# Patient Record
Sex: MALE | Race: WHITE | NOT HISPANIC OR LATINO | Employment: FULL TIME | URBAN - METROPOLITAN AREA
[De-identification: names, ages, dates, MRNs, and addresses within clinical notes are randomized per-mention and may not be internally consistent; named-entity substitution may affect disease eponyms.]

---

## 2017-04-05 ENCOUNTER — ALLSCRIPTS OFFICE VISIT (OUTPATIENT)
Dept: OTHER | Facility: OTHER | Age: 42
End: 2017-04-05

## 2017-05-04 ENCOUNTER — ALLSCRIPTS OFFICE VISIT (OUTPATIENT)
Dept: OTHER | Facility: OTHER | Age: 42
End: 2017-05-04

## 2018-01-13 VITALS
RESPIRATION RATE: 16 BRPM | TEMPERATURE: 98.7 F | WEIGHT: 203.31 LBS | HEIGHT: 69 IN | DIASTOLIC BLOOD PRESSURE: 92 MMHG | OXYGEN SATURATION: 97 % | HEART RATE: 80 BPM | SYSTOLIC BLOOD PRESSURE: 126 MMHG | BODY MASS INDEX: 30.11 KG/M2

## 2018-01-13 VITALS
HEIGHT: 70 IN | WEIGHT: 202 LBS | SYSTOLIC BLOOD PRESSURE: 138 MMHG | BODY MASS INDEX: 28.92 KG/M2 | OXYGEN SATURATION: 97 % | TEMPERATURE: 98.1 F | HEART RATE: 94 BPM | DIASTOLIC BLOOD PRESSURE: 88 MMHG

## 2018-08-20 ENCOUNTER — OFFICE VISIT (OUTPATIENT)
Dept: FAMILY MEDICINE CLINIC | Facility: CLINIC | Age: 43
End: 2018-08-20
Payer: COMMERCIAL

## 2018-08-20 ENCOUNTER — TRANSCRIBE ORDERS (OUTPATIENT)
Dept: ADMINISTRATIVE | Facility: HOSPITAL | Age: 43
End: 2018-08-20

## 2018-08-20 ENCOUNTER — APPOINTMENT (OUTPATIENT)
Dept: RADIOLOGY | Facility: MEDICAL CENTER | Age: 43
End: 2018-08-20
Payer: COMMERCIAL

## 2018-08-20 VITALS
DIASTOLIC BLOOD PRESSURE: 90 MMHG | OXYGEN SATURATION: 98 % | BODY MASS INDEX: 29.86 KG/M2 | HEIGHT: 69 IN | SYSTOLIC BLOOD PRESSURE: 120 MMHG | WEIGHT: 201.6 LBS | TEMPERATURE: 97.9 F | HEART RATE: 102 BPM | RESPIRATION RATE: 16 BRPM

## 2018-08-20 DIAGNOSIS — M25.561 BILATERAL CHRONIC KNEE PAIN: Primary | ICD-10-CM

## 2018-08-20 DIAGNOSIS — M25.562 BILATERAL CHRONIC KNEE PAIN: ICD-10-CM

## 2018-08-20 DIAGNOSIS — M25.511 ACUTE PAIN OF RIGHT SHOULDER: ICD-10-CM

## 2018-08-20 DIAGNOSIS — G89.29 BILATERAL CHRONIC KNEE PAIN: Primary | ICD-10-CM

## 2018-08-20 DIAGNOSIS — G89.29 BILATERAL CHRONIC KNEE PAIN: ICD-10-CM

## 2018-08-20 DIAGNOSIS — M25.562 BILATERAL CHRONIC KNEE PAIN: Primary | ICD-10-CM

## 2018-08-20 DIAGNOSIS — M25.561 BILATERAL CHRONIC KNEE PAIN: ICD-10-CM

## 2018-08-20 PROCEDURE — 73564 X-RAY EXAM KNEE 4 OR MORE: CPT

## 2018-08-20 PROCEDURE — 99214 OFFICE O/P EST MOD 30 MIN: CPT | Performed by: FAMILY MEDICINE

## 2018-08-20 PROCEDURE — 73030 X-RAY EXAM OF SHOULDER: CPT

## 2018-08-20 NOTE — PROGRESS NOTES
Assessment/Plan:   1  Bilateral chronic knee pain  Unclear as to the exact etiology of patient's knee pain  There is concerned secondary to the long duration of his knee pain  Will check x-rays of both knees to rule out gross abnormalities  He was advised he would highly benefit from seeing a physical therapist   He may start rice treatment  Ice knee for 10-15 minutes multiple times a day  Elevate at nighttime  He may benefit from using a knee brace  - XR knee 4+ vw left injury; Future  - XR knee 4+ vw right injury; Future  - XR shoulder 2+ vw right; Future  - Ambulatory referral to Physical Therapy; Future    2  Acute pain of right shoulder  Patient's symptoms appear likely secondary to rotator cuff syndrome  Will check shoulder x-ray to rule out gross abnormalities  He was advised that the greatest benefit would be physical therapy  He may take anti-inflammatories as well for symptom relief  Follow-up in 1-2 months if symptoms are persisting   - XR knee 4+ vw left injury; Future  - XR knee 4+ vw right injury; Future  - XR shoulder 2+ vw right; Future  - Ambulatory referral to Physical Therapy; Future     There are no diagnoses linked to this encounter  Subjective:    Chief Complaint   Patient presents with    Follow-up     both knees pain x 15 years/and right shoulder pain x1 year        Patient ID: Shen Cedillo is a 40 y o  male  Patient is a 55-year-old male presents today with multiple complaints  He has chronic bilateral knee pain as well as right shoulder pain  He has had been having these problems for the past few years  He states he has been having chronic knee pain for approximately 15 years  He states that he was in the [employer] and noticed pain at that time 15 years ago while he was running  He states that pain usually lasts for days after his runs  He denies any locking or giving out of his knee  He has not been taking anything for his current symptoms    He states for the past year, he has been having persistent right shoulder pain  He has been noticing limitations dot        Review of Systems   Constitutional: Negative for activity change, chills, fatigue and fever  HENT: Negative for congestion, ear pain, sinus pressure and sore throat  Eyes: Negative for redness, itching and visual disturbance  Respiratory: Negative for cough and shortness of breath  Cardiovascular: Negative for chest pain and palpitations  Gastrointestinal: Negative for abdominal pain, diarrhea and nausea  Endocrine: Negative for cold intolerance and heat intolerance  Genitourinary: Negative for dysuria, flank pain and frequency  Musculoskeletal: Negative for arthralgias, back pain, gait problem and myalgias  Skin: Negative for color change  Allergic/Immunologic: Negative for environmental allergies  Neurological: Negative for dizziness, numbness and headaches  Psychiatric/Behavioral: Negative for behavioral problems and sleep disturbance  The following portions of the patient's history were reviewed and updated as appropriate : past family history, past medical history, past social history and past surgical history  No current outpatient prescriptions on file  Objective:    Vitals:    08/20/18 1203   BP: 120/90   BP Location: Left arm   Patient Position: Sitting   Cuff Size: Adult   Pulse: 102   Resp: 16   Temp: 97 9 °F (36 6 °C)   TempSrc: Tympanic   SpO2: 98%   Weight: 91 4 kg (201 lb 9 6 oz)   Height: 5' 8 7" (1 745 m)        Physical Exam   Constitutional: He is oriented to person, place, and time  He appears well-developed and well-nourished  HENT:   Head: Normocephalic and atraumatic  Nose: Nose normal    Mouth/Throat: No oropharyngeal exudate  Eyes: Pupils are equal, round, and reactive to light  Right eye exhibits no discharge  Left eye exhibits no discharge  Neck: Normal range of motion  Neck supple  No tracheal deviation present     Cardiovascular: Normal rate, regular rhythm and intact distal pulses  Exam reveals no gallop and no friction rub  No murmur heard  Pulses:       Dorsalis pedis pulses are 2+ on the right side, and 2+ on the left side  Posterior tibial pulses are 2+ on the right side, and 2+ on the left side  Pulmonary/Chest: Effort normal and breath sounds normal  No respiratory distress  He has no wheezes  He has no rales  Abdominal: Soft  Bowel sounds are normal  He exhibits no distension  There is no tenderness  There is no rebound and no guarding  Musculoskeletal: Normal range of motion  He exhibits no edema  Lymphadenopathy:        Head (right side): No submental and no submandibular adenopathy present  Head (left side): No submental and no submandibular adenopathy present  He has no cervical adenopathy  Right cervical: No superficial cervical, no deep cervical and no posterior cervical adenopathy present  Left cervical: No superficial cervical, no deep cervical and no posterior cervical adenopathy present  Neurological: He is alert and oriented to person, place, and time  No cranial nerve deficit or sensory deficit  Skin: Skin is warm, dry and intact  Psychiatric: His speech is normal and behavior is normal  Judgment normal  His mood appears not anxious  Cognition and memory are normal  He does not exhibit a depressed mood  Vitals reviewed

## 2018-10-29 ENCOUNTER — TELEPHONE (OUTPATIENT)
Dept: FAMILY MEDICINE CLINIC | Facility: CLINIC | Age: 43
End: 2018-10-29

## 2018-10-29 NOTE — TELEPHONE ENCOUNTER
Left message for patient to call office if he would like to schedule a f/u visit from [facility]

## 2018-10-29 NOTE — TELEPHONE ENCOUNTER
----- Message from Mulugeta Brandt sent at 10/29/2018  1:43 PM EDT -----  Regarding: [facility] Follow Up  [facility] 10/26/18 for Other acute sinusitis, recurrence not specified

## 2019-02-01 ENCOUNTER — EVALUATION (OUTPATIENT)
Dept: PHYSICAL THERAPY | Facility: CLINIC | Age: 44
End: 2019-02-01
Payer: COMMERCIAL

## 2019-02-01 DIAGNOSIS — M25.511 ACUTE PAIN OF RIGHT SHOULDER: ICD-10-CM

## 2019-02-01 DIAGNOSIS — M25.562 BILATERAL CHRONIC KNEE PAIN: Primary | ICD-10-CM

## 2019-02-01 DIAGNOSIS — M25.561 BILATERAL CHRONIC KNEE PAIN: Primary | ICD-10-CM

## 2019-02-01 DIAGNOSIS — G89.29 BILATERAL CHRONIC KNEE PAIN: Primary | ICD-10-CM

## 2019-02-01 PROCEDURE — 97162 PT EVAL MOD COMPLEX 30 MIN: CPT

## 2019-02-01 NOTE — PROGRESS NOTES
PT Evaluation     Today's date: 2019  Patient name: Angie Gamble  : 1975  MRN: [id number]  Referring provider: Taras Guerra DO  Dx:   Encounter Diagnosis     ICD-10-CM    1  Bilateral chronic knee pain M25 561     M25 562     G89 29    2  Acute pain of right shoulder M25 511                   Assessment  Assessment details: Angie Gamble is a 44 y o  male presenting to PT with pain, decreased shoulder range of motion, decreased LE and R shoulder strength, decreased B/L hamstring flexibility, and decreased tolerance to activity secondary to likely L knee OA and R shoulder impingement with RTC tendonitis  Patient would benefit from skilled PT services to address these impairments and to maximize function in order to improve quality of life  Thank you for the referral   Impairments: abnormal or restricted ROM, activity intolerance, impaired balance, impaired physical strength, lacks appropriate home exercise program, pain with function, weight-bearing intolerance and poor posture   Functional limitations: difficulty participating in recreational activities, pain with household chores and work activities, pain with sleeping  Symptom irritability: moderateUnderstanding of Dx/Px/POC: good   Prognosis: good    Goals  STG  1) R shoulder ROM will improve 50% in 4 weeks  2) LLE strength will improve 1/2 grade in 4 weeks  3) R shoulder strength will improve 1/2 grade in 4 weeks  LTG  1) Patient is independent in HEP  2) Patient will ascend/descend one flight of stairs independently with no increased pain in 8 weeks  3) Ambulation will be improved to community distance for purpose of recreation, with no increased pain, in 8 weeks  4) Patient will return to pre-injury gym routine with less than 3/10 pain in 8 weeks      Plan  Patient would benefit from: skilled physical therapy  Planned modality interventions: cryotherapy  Planned therapy interventions: joint mobilization, manual therapy, muscle pump exercises, neuromuscular re-education, patient education, strengthening, stretching, therapeutic activities, therapeutic exercise, home exercise program, gait training, flexibility, balance/weight bearing training, abdominal trunk stabilization, postural training and body mechanics training  Frequency: 1x week  Duration in weeks: 8  Treatment plan discussed with: patient        Subjective Evaluation    History of Present Illness  Mechanism of injury: Patient presents with B/L knee pain over the past 10 years or so  He is a teacher now, 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  He changed careers because he saw how his body was deteriorating and becoming more painful when working and exercising  He noticed that if he would run 2 miles at the gym, he would have increased knee pain for the following few days to a week  Now, he cannot run at all  After going for just a 2 mile walk, he feels instability in the knee, "like it will just buckle on me and give out "  Regarding the R shoulder, he felt he tweaked it when pushing a heavy roll of wiring up above him into the 3rd floor attic  He felt this happen back in August, and has just been waiting for it to feel better and heal on its own, however this has not happened and he still feels pain when performing different activities during the day, especially overhead or lifting    Quality of life: good    Pain  Current pain rating: 3  At best pain ratin  At worst pain ratin  Quality: dull ache, discomfort and grinding  Relieving factors: ice, medications and rest (ibuprofen/aleve prn)  Aggravating factors: stair climbing, walking, overhead activity, lifting and standing  Progression: worsening      Diagnostic Tests  X-ray: normal  MRI studies: normal  Treatments  Previous treatment: medication  Current treatment: physical therapy  Patient Goals  Patient goals for therapy: decreased pain and increased strength          Objective     Postural Observations  Seated posture: fair  Standing posture: fair  Correction of posture: makes symptoms better        Palpation     Right   Tenderness of the middle deltoid and supraspinatus  Tenderness     Right Shoulder  Tenderness in the [facility] joint, acromion and supraspinatus tendon  Additional Tenderness Details  Patient denies TTP    Cervical/Thoracic Screen   Cervical range of motion within normal limits    Neurological Testing     Additional Neurological Details  Normal UE neuro screen  Normal LE neuro screen    Active Range of Motion     Right Shoulder   Flexion: WFL and with pain  Abduction: WFL and with pain  External rotation BTH: C7   Internal rotation BTB: L1 with pain  Left Knee   Normal active range of motion    Right Knee   Normal active range of motion    Strength/Myotome Testing     Right Shoulder     Planes of Motion   Flexion: 4-   Extension: 4-   Abduction: 4-   External rotation at 0°: 4-   Internal rotation at 0°: 4   Horizontal abduction: 3+     Isolated Muscles   Biceps: 4+   Lower trapezius: 4-   Middle trapezius: 4-   Rhomboids: 4-   Triceps: 4+     Left Hip   Planes of Motion   Flexion: 4-  Extension: 4  Abduction: 4-  Adduction: 4+    Isolated Muscles   Gluteus linh: 4  Gluteus medius: 4-    Right Hip   Planes of Motion   Flexion: 4  Extension: 4  Abduction: 4  Adduction: 4+    Isolated Muscles   Gluteus maximums: 4  Gluteus medius: 4    Left Knee   Flexion: 4+  Extension: 4-  Quadriceps contraction: good    Right Knee   Flexion: 4+  Extension: 4+  Quadriceps contraction: good    Additional Strength Details  Pain with resisted L knee extension    Tests     Right Shoulder   Positive empty can, Hawkin's and passive horizontal adduction  Left Knee   Positive patellar compression     Negative anterior drawer, Apley's compression, Apley's distraction, lateral Mojgan, medial Mojgan, posterior drawer, valgus stress test at 0 degrees, valgus stress test at 30 degrees, varus stress test at 0 degrees and varus stress test at 30 degrees  Right Knee   Negative anterior drawer, Apley's compression, Apley's distraction, lateral Mojgan, medial Mojgan, patellar compression, posterior drawer, valgus stress test at 0 degrees, valgus stress test at 30 degrees, varus stress test at 0 degrees and varus stress test at 30 degrees       FOTO score: 39  Expected at discharge: 64      Precautions: none    Daily Treatment Diary     Manuals             B/L HS stretch                                                    Exercise Diary              Elliptical             UBE                          HS stretch             Glute bridge             SLR flexion             SLR abduction             SLR adduction             SLR extension                          LAQ             CC TKE             Fwd step ups             Lateral step ups             Rocker board squats                                                                                                        Modalities             CP L knee prn

## 2019-02-06 ENCOUNTER — APPOINTMENT (OUTPATIENT)
Dept: PHYSICAL THERAPY | Facility: CLINIC | Age: 44
End: 2019-02-06
Payer: COMMERCIAL

## 2019-02-28 ENCOUNTER — APPOINTMENT (OUTPATIENT)
Dept: PHYSICAL THERAPY | Facility: CLINIC | Age: 44
End: 2019-02-28
Payer: COMMERCIAL

## 2019-03-06 ENCOUNTER — OFFICE VISIT (OUTPATIENT)
Dept: PHYSICAL THERAPY | Facility: CLINIC | Age: 44
End: 2019-03-06
Payer: COMMERCIAL

## 2019-03-06 DIAGNOSIS — M25.562 CHRONIC PAIN OF BOTH KNEES: Primary | ICD-10-CM

## 2019-03-06 DIAGNOSIS — M25.511 CHRONIC RIGHT SHOULDER PAIN: ICD-10-CM

## 2019-03-06 DIAGNOSIS — G89.29 CHRONIC RIGHT SHOULDER PAIN: ICD-10-CM

## 2019-03-06 DIAGNOSIS — G89.29 CHRONIC PAIN OF BOTH KNEES: Primary | ICD-10-CM

## 2019-03-06 DIAGNOSIS — M25.561 CHRONIC PAIN OF BOTH KNEES: Primary | ICD-10-CM

## 2019-03-06 PROCEDURE — 97140 MANUAL THERAPY 1/> REGIONS: CPT

## 2019-03-06 PROCEDURE — 97112 NEUROMUSCULAR REEDUCATION: CPT

## 2019-03-06 PROCEDURE — 97110 THERAPEUTIC EXERCISES: CPT

## 2019-03-06 NOTE — PROGRESS NOTES
Daily Note     Today's date: 3/6/2019  Patient name: Rajat Marcos  : 1975  MRN: [id number]  Referring provider: Manjit Prado DO  Dx:   Encounter Diagnosis     ICD-10-CM    1  Chronic pain of both knees M25 561     M25 562     G89 29    2  Chronic right shoulder pain M25 511     G89 29                   Subjective: Patient arrived for first treatment since initial eval on   He reported his shoulder remain fairly unchanged, having symptoms when completing scap retractions as part of home program  Mild improvement in bilateral knee pain, however continues to experience tightness with performing three miles on the elliptical  Maintained compliance to home program and gym routine  Objective: See treatment diary below  Updated home program       Assessment: Had no increase in symptoms noted during UBE or elliptical warm-up  Lateral knee pain noted during manual hamstring stretch  Unresolved LE weakness demonstrated with strengthening exercises, providing cueing to ensure proper form  Educated on shoulder isometrics for RTC strengthening  Plan: Continue per plan of care  Progress treatment as tolerated           Precautions: none    Daily Treatment Diary   Manuals 3/6            B/L HS stretch EV                                                   Exercise Diary              Elliptical 7 min            UBE 3 min ea            Shoulder isometrics (flex, ext, IR, ER) 5"x10 ea                         HS stretch NP            Glute bridge 2x10            SLR flexion 2x10            SLR abduction 2x10            SLR adduction 2x10            SLR extension 2x10                         LAQ NV            CC TKE NV            Fwd step ups NV            Lateral step ups NV            Rocker board squats NV                                                                                                       Modalities             CP L knee prn NP                           HEP: shoulder isometrics 4-way

## 2019-03-14 ENCOUNTER — OFFICE VISIT (OUTPATIENT)
Dept: PHYSICAL THERAPY | Facility: CLINIC | Age: 44
End: 2019-03-14
Payer: COMMERCIAL

## 2019-03-14 DIAGNOSIS — G89.29 CHRONIC PAIN OF BOTH KNEES: Primary | ICD-10-CM

## 2019-03-14 DIAGNOSIS — G89.29 CHRONIC RIGHT SHOULDER PAIN: ICD-10-CM

## 2019-03-14 DIAGNOSIS — M25.511 CHRONIC RIGHT SHOULDER PAIN: ICD-10-CM

## 2019-03-14 DIAGNOSIS — M25.562 CHRONIC PAIN OF BOTH KNEES: Primary | ICD-10-CM

## 2019-03-14 DIAGNOSIS — M25.561 CHRONIC PAIN OF BOTH KNEES: Primary | ICD-10-CM

## 2019-03-14 PROCEDURE — 97112 NEUROMUSCULAR REEDUCATION: CPT

## 2019-03-14 PROCEDURE — 97110 THERAPEUTIC EXERCISES: CPT

## 2019-03-14 NOTE — PROGRESS NOTES
Daily Note     Today's date: 3/14/2019  Patient name: Chandni Zuñiga  : 1975  MRN: [id number]  Referring provider: Ava Palafox DO  Dx:   Encounter Diagnosis     ICD-10-CM    1  Chronic pain of both knees M25 561     M25 562     G89 29    2  Chronic right shoulder pain M25 511     G89 29        Start Time: 1720  Stop Time: 1820  Total time in clinic (min): 60 minutes      ADDENDUM: Patient cancelled his follow up visits in therapy and has failed to reschedule  He will be discharged from my care at this time  -AN, PT (19)      Subjective: Patient tells me shoulder is feeling pretty good today, stating "it's on or off depending on the day "      Objective: See treatment diary below  FOTO score improved from 58 to 63 (shoulder) and 38 to 66 (knees) in 3 visits  Assessment: Tolerates treatment well  Patient reports B/L knee "tightness" whenever completing an exercise that requires terminal extension, stating he feels like his kneecap feels unstable  R shoulder discomfort with UBE likely due to fatigue as patient reports muscle burning feeling  Progressed HEP and advised patient to follow up in 2 weeks to assess progress  Patient would benefit from continued skilled therapy in an effort to improve function  Plan: Continue per plan of care  Progress treatment as tolerated           Precautions: none    Daily Treatment Diary    Manuals 3/6 3/14           B/L HS stretch EV NV                                                  Exercise Diary              Elliptical 7 min 8 min           UBE 3'/3' 3'/3'           Shoulder isometrics (flex, ext, IR, ER) 5"x10 ea 5"x10 ea           Wall ball circles  2x20 each           HS stretch NP NP           Glute bridge 2x10 NV           SLR flexion 2x10 NV           SLR abduction 2x10 NV           SLR adduction 2x10 NV           SLR extension 2x10 NV                        LAQ NV NP           CC TKE (B/L) NV GTB 5" x20           Fwd step ups (B/L) NV 8" 2x10 Lateral step ups (B/L) NV 8" x15 each           Rocker board squats NV x10 ea                        TB rows  GTB  3 sec 2x10           TB extensions  GTB 2x10           TB IR  GTB 3x10           TB ER  GTB 3x10                                     Modalities             CP L knee prn NP defers

## 2019-03-28 ENCOUNTER — APPOINTMENT (OUTPATIENT)
Dept: PHYSICAL THERAPY | Facility: CLINIC | Age: 44
End: 2019-03-28
Payer: COMMERCIAL

## 2019-08-05 ENCOUNTER — OFFICE VISIT (OUTPATIENT)
Dept: FAMILY MEDICINE CLINIC | Facility: CLINIC | Age: 44
End: 2019-08-05
Payer: COMMERCIAL

## 2019-08-05 VITALS
WEIGHT: 194 LBS | DIASTOLIC BLOOD PRESSURE: 86 MMHG | HEART RATE: 90 BPM | TEMPERATURE: 97.8 F | HEIGHT: 69 IN | SYSTOLIC BLOOD PRESSURE: 130 MMHG | OXYGEN SATURATION: 98 % | BODY MASS INDEX: 28.73 KG/M2

## 2019-08-05 DIAGNOSIS — A63.0 CONDYLOMA: Primary | ICD-10-CM

## 2019-08-05 PROCEDURE — 17110 DESTRUCTION B9 LES UP TO 14: CPT | Performed by: FAMILY MEDICINE

## 2019-08-05 RX ORDER — IMIQUIMOD 12.5 MG/.25G
1 CREAM TOPICAL 3 TIMES WEEKLY
Qty: 12 EACH | Refills: 0 | Status: SHIPPED | OUTPATIENT
Start: 2019-08-05 | End: 2022-04-20

## 2019-08-05 RX ORDER — METHOCARBAMOL 500 MG/1
TABLET, FILM COATED ORAL
Refills: 0 | COMMUNITY
Start: 2019-07-04 | End: 2022-04-20

## 2019-08-05 NOTE — PROGRESS NOTES
[facility]      NAME: Merlin Goes  AGE: 44 y o  SEX: male  : 1975   MRN: [id number]    DATE: 2019  TIME: 5:06 PM    Assessment and Plan     Problem List Items Addressed This Visit     None      Visit Diagnoses     Condyloma    -  Primary    Relevant Medications    imiquimod (ALDARA) 5 % cream        Lesion Destruction  Date/Time: 2019 5:06 PM  Performed by: Vivian Torres DO  Authorized by: Vivian Torres DO     Procedure Details - Lesion Destruction:     Number of Lesions:  2  Lesion 1:     Body area: Anogenital    Anogenital location:  Penis    Initial size (mm):  5    Malignancy: benign lesion      Destruction method: cryotherapy      Extensive destruction required?: No    Lesion 2:     Body area: Anogenital    Anogenital location:  Penis    Initial size (mm):  4    Malignancy: benign lesion      Destruction method: cryotherapy      Extensive destruction required?: No    Lesion 6:      Two separate condyloma on shaft of penis treated with liquid nitrogen  Patient tolerated procedure well  Return to office in:  P r n  Chief Complaint     Chief Complaint   Patient presents with    Rash     Pt c/o skin rash  History of Present Illness     Patient returns to the office for recurrence of condyloma  He has had recurrences almost yearly for several years  They have been treated in the past with liquid nitrogen application usually with good results  The following portions of the patient's history were reviewed and updated as appropriate: allergies, current medications, past family history, past medical history, past social history, past surgical history and problem list     Review of Systems   Review of Systems   Skin:        General warts       Active Problem List   There is no problem list on file for this patient        Objective   /86 (BP Location: Left arm, Patient Position: Sitting, Cuff Size: Adult)   Pulse 90   Temp 97 8 °F (36 6 °C) (Tympanic)   Ht 5' 9" (1 753 m)   Wt 88 kg (194 lb)   SpO2 98%   BMI 28 65 kg/m²     Physical Exam   Skin:   Two general warts on shaft of penis           Current Medications     Current Outpatient Medications:     methocarbamol (ROBAXIN) 500 mg tablet, TAKE 1 TABLET BY MOUTH 3 TIMES A DAY AS NEEDED FOR MUSCLE TIGHTNESS, Disp: , Rfl: 0    imiquimod (ALDARA) 5 % cream, Apply 1 packet topically 3 (three) times a week, Disp: 12 each, Rfl: 0    Health Maintenance     Health Maintenance   Topic Date Due    BMI: Followup Plan  10/16/1994    INFLUENZA VACCINE  07/01/2019    BMI: Adult  08/20/2019    Depression Screening PHQ  02/01/2020    DTaP,Tdap,and Td Vaccines (2 - Td) 02/27/2022    Pneumococcal Vaccine: 65+ Years (1 of 2 - PCV13) 10/16/2041    Pneumococcal Vaccine: Pediatrics (0 to 5 Years) and At-Risk Patients (6 to 59 Years)  Aged Out    HEPATITIS B VACCINES  Aged [employer] History   Administered Date(s) Administered    Tdap 02/27/2012    Tuberculin Skin Test-PPD Intradermal 06/26/2013, 08/20/2014       Petrona White DO  [facility] Medical [facility]

## 2019-12-18 ENCOUNTER — OFFICE VISIT (OUTPATIENT)
Dept: FAMILY MEDICINE CLINIC | Facility: CLINIC | Age: 44
End: 2019-12-18
Payer: COMMERCIAL

## 2019-12-18 VITALS
OXYGEN SATURATION: 99 % | BODY MASS INDEX: 29.53 KG/M2 | SYSTOLIC BLOOD PRESSURE: 130 MMHG | HEART RATE: 90 BPM | DIASTOLIC BLOOD PRESSURE: 90 MMHG | HEIGHT: 69 IN | TEMPERATURE: 98 F | WEIGHT: 199.4 LBS

## 2019-12-18 DIAGNOSIS — A63.0 CONDYLOMA: ICD-10-CM

## 2019-12-18 DIAGNOSIS — B35.9 TINEA: Primary | ICD-10-CM

## 2019-12-18 PROCEDURE — 3008F BODY MASS INDEX DOCD: CPT | Performed by: FAMILY MEDICINE

## 2019-12-18 PROCEDURE — 99214 OFFICE O/P EST MOD 30 MIN: CPT | Performed by: FAMILY MEDICINE

## 2019-12-18 RX ORDER — IMIQUIMOD 12.5 MG/.25G
1 CREAM TOPICAL 3 TIMES WEEKLY
Qty: 12 EACH | Refills: 0 | Status: SHIPPED | OUTPATIENT
Start: 2019-12-18 | End: 2020-09-16 | Stop reason: SDUPTHER

## 2019-12-20 NOTE — PROGRESS NOTES
[facility]      NAME: Ella Toro  AGE: 38 y o  SEX: male  : 1975   MRN: [id number]    DATE: 2019  TIME: 7:38 PM    Assessment and Plan     Problem List Items Addressed This Visit     None      Visit Diagnoses     Tinea    -  Primary    Relevant Medications    econazole nitrate 1 % cream    Condyloma        Relevant Medications    imiquimod (ALDARA) 5 % cream      Patient was reassured that the lesions seen at the site of his prior surgeries do not appear to pose any significant medical problem  At this point I recommend that he can just watch for now  If they do enlarge to the point where they cause him difficulty he can certainly be referred to Plastic surgery for more definitive excision  Return to office in:  P r n  Chief Complaint     Chief Complaint   Patient presents with    Other       History of Present Illness     Patient was seen with a chief complaint several skin conditions  First new wishes to have several prior surgical sites evaluated  He was seen by Dermatology last year and had several benign lesions removed from his lower abdomen and suprapubic area  He feels that they may be starting to regrow  Secondly he is having a very minor recurrence of genital condyloma  These have been typically treated with liquid nitrogen in the past though at his last visit I did give him a prescription for Aldara for him to utilize if he saw any recurrence  Unfortunately he did not fill that prescription at the time  Thirdly he has a rash on the tip of his penis which was treated previously with topical antifungal with complete resolution  This occurred several years ago  He is requesting a refill of that medication        The following portions of the patient's history were reviewed and updated as appropriate: allergies, current medications, past family history, past medical history, past social history, past surgical history and problem list     Review of Systems   Review of Systems   Constitutional: Negative  Respiratory: Negative  Cardiovascular: Negative  Gastrointestinal: Negative  Genitourinary: Negative  Musculoskeletal: Negative  Skin: Positive for rash  Psychiatric/Behavioral: Negative  Active Problem List   There is no problem list on file for this patient  Objective   /90 (BP Location: Left arm, Patient Position: Sitting, Cuff Size: Adult)   Pulse 90   Temp 98 °F (36 7 °C) (Tympanic)   Ht 5' 9" (1 753 m)   Wt 90 4 kg (199 lb 6 4 oz)   SpO2 99%   BMI 29 45 kg/m²     Physical Exam   Skin:   Several small 1-2 mm lesions possibly consistent with early condyloma on shaft of penis  Site of prior surgeries in suprapubic region shows slight regrowth of skin lesion on 1 of the surgical sites but the other to appear to be fine with perhaps only slight prominence of scar tissue  Lastly some erythema over the head penis  Patient is uncircumcised           Current Medications     Current Outpatient Medications:     econazole nitrate 1 % cream, Apply topically 2 (two) times a day, Disp: 15 g, Rfl: 0    imiquimod (ALDARA) 5 % cream, Apply 1 packet topically 3 (three) times a week (Patient not taking: Reported on 12/18/2019), Disp: 12 each, Rfl: 0    imiquimod (ALDARA) 5 % cream, Apply 1 packet topically 3 (three) times a week, Disp: 12 each, Rfl: 0    methocarbamol (ROBAXIN) 500 mg tablet, TAKE 1 TABLET BY MOUTH 3 TIMES A DAY AS NEEDED FOR MUSCLE TIGHTNESS, Disp: , Rfl: 0    Health Maintenance     Health Maintenance   Topic Date Due    HIV Screening  10/16/1991    BMI: Followup Plan  10/16/1994    Influenza Vaccine  01/14/2020 (Originally 7/1/2019)    Depression Screening PHQ  02/01/2020    BMI: Adult  12/18/2020    DTaP,Tdap,and Td Vaccines (2 - Td) 02/27/2022    Pneumococcal Vaccine: 65+ Years (1 of 2 - PCV13) 10/16/2041    Pneumococcal Vaccine: Pediatrics (0 to 5 Years) and At-Risk Patients (6 to 59 Years)  Aged Out    HIB Vaccine  Aged Out    Hepatitis B Vaccine  Aged Out    IPV Vaccine  Aged Out    Hepatitis A Vaccine  Aged Out    Meningococcal ACWY Vaccine  Aged Out    HPV Vaccine  Aged Out     Immunization History   Administered Date(s) Administered    Tdap 02/27/2012    Tuberculin Skin Test-PPD Intradermal 06/26/2013, 08/20/2014       Jorge Luo DO  [facility]

## 2020-03-19 ENCOUNTER — TELEPHONE (OUTPATIENT)
Dept: FAMILY MEDICINE CLINIC | Facility: CLINIC | Age: 45
End: 2020-03-19

## 2020-03-19 DIAGNOSIS — B35.9 TINEA: ICD-10-CM

## 2020-03-19 NOTE — TELEPHONE ENCOUNTER
Patient was treated for a rash   He doesn't think it was ever completely cleared up, and now he is finished with his cream and it is coming back  He doesn't know if he just needs a refill or something stronger      Please advise    [phone]          thanks

## 2020-09-16 ENCOUNTER — OFFICE VISIT (OUTPATIENT)
Dept: FAMILY MEDICINE CLINIC | Facility: CLINIC | Age: 45
End: 2020-09-16
Payer: COMMERCIAL

## 2020-09-16 VITALS
HEIGHT: 69 IN | WEIGHT: 204.8 LBS | SYSTOLIC BLOOD PRESSURE: 130 MMHG | OXYGEN SATURATION: 98 % | DIASTOLIC BLOOD PRESSURE: 82 MMHG | BODY MASS INDEX: 30.33 KG/M2 | TEMPERATURE: 97.3 F | HEART RATE: 98 BPM

## 2020-09-16 DIAGNOSIS — A63.0 CONDYLOMA: ICD-10-CM

## 2020-09-16 PROCEDURE — 17110 DESTRUCTION B9 LES UP TO 14: CPT | Performed by: FAMILY MEDICINE

## 2020-09-16 RX ORDER — IMIQUIMOD 12.5 MG/.25G
1 CREAM TOPICAL 3 TIMES WEEKLY
Qty: 12 EACH | Refills: 1 | Status: SHIPPED | OUTPATIENT
Start: 2020-09-16 | End: 2022-04-20

## 2020-09-16 NOTE — PROGRESS NOTES
Recurrence of genital condyloma    Lesion Destruction    Date/Time: 9/16/2020 6:47 PM  Performed by: Miguel Ángel Craig DO  Authorized by: Miguel Ángel Craig DO     Procedure Details - Lesion Destruction:     Number of Lesions:  3  Lesion 1:     Body area: Anogenital    Anogenital location:  Penis    Initial size (mm):  2    Final defect size (mm):  2    Malignancy: benign lesion      Destruction method: cryotherapy      Extensive destruction required?: No    Lesion 2:     Body area: Anogenital    Anogenital location:  Penis    Initial size (mm):  3    Final defect size (mm):  3    Malignancy: benign lesion      Destruction method: cryotherapy      Extensive destruction required?: No    Lesion 3:     Body area: Anogenital    Anogenital location:  Penis    Initial size (mm):  1    Final defect size (mm):  1    Malignancy: benign lesion      Destruction method: cryotherapy      Extensive destruction required?: No    Lesion 6:       Multiple small condyloma treated with liquid nitrogen without incident

## 2020-12-05 ENCOUNTER — TELEPHONE (OUTPATIENT)
Dept: FAMILY MEDICINE CLINIC | Facility: CLINIC | Age: 45
End: 2020-12-05

## 2020-12-05 ENCOUNTER — TELEMEDICINE (OUTPATIENT)
Dept: FAMILY MEDICINE CLINIC | Facility: CLINIC | Age: 45
End: 2020-12-05
Payer: COMMERCIAL

## 2020-12-05 DIAGNOSIS — Z20.822 EXPOSURE TO COVID-19 VIRUS: Primary | ICD-10-CM

## 2020-12-05 PROCEDURE — 99213 OFFICE O/P EST LOW 20 MIN: CPT | Performed by: FAMILY MEDICINE

## 2020-12-08 DIAGNOSIS — Z20.822 EXPOSURE TO COVID-19 VIRUS: ICD-10-CM

## 2020-12-08 PROCEDURE — U0003 INFECTIOUS AGENT DETECTION BY NUCLEIC ACID (DNA OR RNA); SEVERE ACUTE RESPIRATORY SYNDROME CORONAVIRUS 2 (SARS-COV-2) (CORONAVIRUS DISEASE [COVID-19]), AMPLIFIED PROBE TECHNIQUE, MAKING USE OF HIGH THROUGHPUT TECHNOLOGIES AS DESCRIBED BY CMS-2020-01-R: HCPCS | Performed by: FAMILY MEDICINE

## 2020-12-09 ENCOUNTER — TELEPHONE (OUTPATIENT)
Dept: FAMILY MEDICINE CLINIC | Facility: CLINIC | Age: 45
End: 2020-12-09

## 2020-12-09 LAB — SARS-COV-2 RNA SPEC QL NAA+PROBE: NOT DETECTED

## 2021-08-18 ENCOUNTER — RA CDI HCC (OUTPATIENT)
Dept: OTHER | Facility: HOSPITAL | Age: 46
End: 2021-08-18

## 2021-08-18 NOTE — PROGRESS NOTES
[facility] 75  coding opportunities       Chart reviewed, no opportunity found: CHART REVIEWED, NO OPPORTUNITY FOUND                        Patients insurance company: [employer] (Medicare Advantage and Commercial)

## 2021-09-07 ENCOUNTER — OFFICE VISIT (OUTPATIENT)
Dept: FAMILY MEDICINE CLINIC | Facility: CLINIC | Age: 46
End: 2021-09-07
Payer: COMMERCIAL

## 2021-09-07 VITALS
HEART RATE: 102 BPM | BODY MASS INDEX: 30.36 KG/M2 | HEIGHT: 69 IN | SYSTOLIC BLOOD PRESSURE: 124 MMHG | DIASTOLIC BLOOD PRESSURE: 80 MMHG | OXYGEN SATURATION: 99 % | TEMPERATURE: 97.5 F | WEIGHT: 205 LBS

## 2021-09-07 DIAGNOSIS — G89.29 CHRONIC MIDLINE THORACIC BACK PAIN: ICD-10-CM

## 2021-09-07 DIAGNOSIS — M54.6 CHRONIC MIDLINE THORACIC BACK PAIN: ICD-10-CM

## 2021-09-07 DIAGNOSIS — B36.0 TINEA VERSICOLOR: Primary | ICD-10-CM

## 2021-09-07 DIAGNOSIS — A63.0 CONDYLOMA: ICD-10-CM

## 2021-09-07 DIAGNOSIS — L98.9 SKIN LESION: ICD-10-CM

## 2021-09-07 PROCEDURE — 3008F BODY MASS INDEX DOCD: CPT | Performed by: FAMILY MEDICINE

## 2021-09-07 PROCEDURE — 99214 OFFICE O/P EST MOD 30 MIN: CPT | Performed by: FAMILY MEDICINE

## 2021-09-07 PROCEDURE — 1036F TOBACCO NON-USER: CPT | Performed by: FAMILY MEDICINE

## 2021-09-07 RX ORDER — CYCLOBENZAPRINE HCL 5 MG
5 TABLET ORAL
Qty: 30 TABLET | Refills: 0 | Status: SHIPPED | OUTPATIENT
Start: 2021-09-07 | End: 2022-04-20

## 2021-09-07 RX ORDER — KETOCONAZOLE 20 MG/ML
1 SHAMPOO TOPICAL 2 TIMES WEEKLY
Qty: 120 ML | Refills: 3 | Status: SHIPPED | OUTPATIENT
Start: 2021-09-09 | End: 2022-04-20
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
## 2022-04-20 ENCOUNTER — APPOINTMENT (OUTPATIENT)
Dept: RADIOLOGY | Facility: CLINIC | Age: 47
End: 2022-04-20
Payer: COMMERCIAL

## 2022-04-20 ENCOUNTER — OFFICE VISIT (OUTPATIENT)
Dept: FAMILY MEDICINE CLINIC | Facility: CLINIC | Age: 47
End: 2022-04-20
Payer: COMMERCIAL

## 2022-04-20 VITALS
DIASTOLIC BLOOD PRESSURE: 70 MMHG | HEART RATE: 80 BPM | SYSTOLIC BLOOD PRESSURE: 118 MMHG | TEMPERATURE: 98.9 F | WEIGHT: 206 LBS | BODY MASS INDEX: 30.51 KG/M2 | OXYGEN SATURATION: 98 % | HEIGHT: 69 IN

## 2022-04-20 DIAGNOSIS — G89.29 CHRONIC RIGHT SHOULDER PAIN: Primary | ICD-10-CM

## 2022-04-20 DIAGNOSIS — M25.551 RIGHT HIP PAIN: ICD-10-CM

## 2022-04-20 DIAGNOSIS — M25.511 CHRONIC RIGHT SHOULDER PAIN: Primary | ICD-10-CM

## 2022-04-20 DIAGNOSIS — M25.511 CHRONIC RIGHT SHOULDER PAIN: ICD-10-CM

## 2022-04-20 DIAGNOSIS — G89.29 CHRONIC RIGHT SHOULDER PAIN: ICD-10-CM

## 2022-04-20 PROCEDURE — 1036F TOBACCO NON-USER: CPT | Performed by: FAMILY MEDICINE

## 2022-04-20 PROCEDURE — 73030 X-RAY EXAM OF SHOULDER: CPT

## 2022-04-20 PROCEDURE — 99214 OFFICE O/P EST MOD 30 MIN: CPT | Performed by: FAMILY MEDICINE

## 2022-04-20 PROCEDURE — 3008F BODY MASS INDEX DOCD: CPT | Performed by: FAMILY MEDICINE

## 2022-04-20 PROCEDURE — 73502 X-RAY EXAM HIP UNI 2-3 VIEWS: CPT

## 2022-04-20 RX ORDER — NAPROXEN 500 MG/1
500 TABLET ORAL 2 TIMES DAILY WITH MEALS
Qty: 60 TABLET | Refills: 0 | Status: CANCELLED | OUTPATIENT
Start: 2022-04-20

## 2022-04-20 NOTE — ASSESSMENT & PLAN NOTE
Some point tenderness along lateral hip and discomfort with hip flexion and abduction  No weakness noted  Obtain right hip Xray  He may take ibuprofen as needed for pain relief  Avoid exercises which may cause worsening symptoms  Patient referred to Ortho for recommendations and evaluation

## 2022-04-20 NOTE — ASSESSMENT & PLAN NOTE
Patient's previous right shoudler Xray showed calcific tendonitis  Symptoms are likely due to previous injury  Obtain shoulder Xray  He may take ibuprofen as needed for pain relief  Avoid exercises which may cause worsening symptoms  Patient referred to Ortho for recommendations and evaluation

## 2022-04-20 NOTE — PROGRESS NOTES
Assessment/Plan:    1  Chronic right shoulder pain  Assessment & Plan:  Patient's previous right shoudler Xray showed calcific tendonitis  Symptoms are likely due to previous injury  Obtain shoulder Xray  He may take ibuprofen as needed for pain relief  Avoid exercises which may cause worsening symptoms  Patient referred to Ortho for recommendations and evaluation  Orders:  -     Ambulatory Referral to Orthopedic Surgery; Future  -     XR shoulder 2+ vw right; Future; Expected date: 04/20/2022    2  Right hip pain  Assessment & Plan:  Some point tenderness along lateral hip and discomfort with hip flexion and abduction  No weakness noted  Obtain right hip Xray  He may take ibuprofen as needed for pain relief  Avoid exercises which may cause worsening symptoms  Patient referred to Ortho for recommendations and evaluation  Orders:  -     Ambulatory Referral to Orthopedic Surgery; Future  -     XR hip/pelv 2-3 vws right if performed; Future; Expected date: 04/20/2022      Subjective:      Patient ID: Asad Fiore is a 40 y o  male  HPI    Patient presenting with right shoulder and right hip pain  His right shoulder pain is chronic for the last 5 years  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  He was performing over head throw of about 50 lbs of dense wires with his right arm when the injury first occurred  Pain is located in his right anterior shoulder  It is always there but is mild  He has full range of motion and no weakness  He went to a few sessions of PT in 2019  He had right shoulder Xray in 2018 which showed calcific tendonitis at humeral head  He takes advil as needed for flare ups which are not often  He started going to the gym 4-5 times per week over the last 2 months to get into shape  He has a  who instructs him how to do exercises properly  He is always warming up and stretching prior to weight lifting     He was doing rowing machine exercises last week and during a back extension he felt a pull in his low right back above his sacrum  He has been able to do other lower body exercises with no pain  A few days later the pain moved to the right anterior and lateral hip  He was having pain with hip flexion and even lifting his legs while walking  Pain was severe over the weekend  He was taking aleve and it is not helping so he stopped the medication  He has been limping for the last few days  Today he woke up and the pain was gone  The following portions of the patient's history were reviewed and updated as appropriate: allergies, current medications, past family history, past medical history, past social history, past surgical history, and problem list     No current outpatient medications on file  Review of Systems   Constitutional: Negative for chills and fever  HENT: Negative for ear pain and sore throat  Eyes: Negative for pain and visual disturbance  Respiratory: Negative for cough and shortness of breath  Cardiovascular: Negative for chest pain and palpitations  Gastrointestinal: Negative for abdominal pain and vomiting  Genitourinary: Negative for dysuria and hematuria  Musculoskeletal: Positive for arthralgias (right hip and right shouldre )  Negative for back pain  Skin: Negative for color change and rash  Neurological: Negative for seizures, syncope and weakness  All other systems reviewed and are negative  Objective:      /70 (BP Location: Left arm, Patient Position: Sitting, Cuff Size: Large)   Pulse 80   Temp 98 9 °F (37 2 °C) (Tympanic)   Ht 5' 9" (1 753 m)   Wt 93 4 kg (206 lb)   SpO2 98%   BMI 30 42 kg/m²          Physical Exam  Vitals and nursing note reviewed  Constitutional:       General: He is not in acute distress  Appearance: Normal appearance  He is not toxic-appearing  HENT:      Head: Normocephalic and atraumatic     Eyes:      Extraocular Movements: Extraocular movements intact  Cardiovascular:      Rate and Rhythm: Normal rate and regular rhythm  Heart sounds: Normal heart sounds  No murmur heard  Pulmonary:      Effort: Pulmonary effort is normal  No respiratory distress  Breath sounds: Normal breath sounds  No wheezing  Musculoskeletal:         General: Normal range of motion  Right shoulder: Tenderness (mild tenderness over AC joint ) present  No swelling, deformity, bony tenderness or crepitus  Normal range of motion  Normal strength  Left shoulder: Normal range of motion  Normal strength  Cervical back: Normal range of motion  Right hip: Tenderness present  No bony tenderness or crepitus  Normal range of motion  Normal strength  Left hip: Normal range of motion  Legs:    Skin:     General: Skin is warm  Neurological:      General: No focal deficit present  Mental Status: He is alert and oriented to person, place, and time  Mental status is at baseline  Psychiatric:         Mood and Affect: Mood normal          Behavior: Behavior normal          Thought Content:  Thought content normal          Judgment: Judgment normal

## 2022-05-23 ENCOUNTER — OFFICE VISIT (OUTPATIENT)
Dept: OBGYN CLINIC | Facility: MEDICAL CENTER | Age: 47
End: 2022-05-23
Payer: COMMERCIAL

## 2022-05-23 VITALS
BODY MASS INDEX: 29.77 KG/M2 | HEIGHT: 69 IN | SYSTOLIC BLOOD PRESSURE: 142 MMHG | DIASTOLIC BLOOD PRESSURE: 88 MMHG | WEIGHT: 201 LBS

## 2022-05-23 DIAGNOSIS — M25.511 CHRONIC RIGHT SHOULDER PAIN: ICD-10-CM

## 2022-05-23 DIAGNOSIS — M75.31 CALCIFIC TENDONITIS OF RIGHT SHOULDER: Primary | ICD-10-CM

## 2022-05-23 DIAGNOSIS — M25.551 RIGHT HIP PAIN: ICD-10-CM

## 2022-05-23 DIAGNOSIS — G89.29 CHRONIC RIGHT SHOULDER PAIN: ICD-10-CM

## 2022-05-23 PROCEDURE — 3008F BODY MASS INDEX DOCD: CPT | Performed by: ORTHOPAEDIC SURGERY

## 2022-05-23 PROCEDURE — 1036F TOBACCO NON-USER: CPT | Performed by: ORTHOPAEDIC SURGERY

## 2022-05-23 PROCEDURE — 99243 OFF/OP CNSLTJ NEW/EST LOW 30: CPT | Performed by: ORTHOPAEDIC SURGERY

## 2022-05-23 NOTE — PROGRESS NOTES
Ortho Sports Medicine Shoulder New Patient Visit     Assesment:   40 y o  male right shoulder calcific tendinitis    Plan:    Conservative treatment:  MRI right shoulder to evaluate for other pathology including labral tear, rotator cuff tear  Ice to shoulder 1-2 times daily, for 20 minutes at a time  PT for ROM and strengthening to shoulder, rotator cuff, scapular stabilizers  Home exercise program for shoulder, including ROM and strenthening  Instructions given to patient of what exercises to perform  Let pain guide return to activities  Imaging: All imaging from today was reviewed by myself and explained to the patient  Injection:    Patient did not want an injection at this visit      Surgery:     No surgery is recommended at this point, continue with conservative treatment plan as noted  Follow up:    No follow-ups on file  Chief Complaint   Patient presents with    Right Shoulder - Pain       History of Present Illness: The patient 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 , 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, referred to me by their primary care physician, seen in clinic for consultation of right shoulder pain  The patient denies a history of diabetes  The patient denies a history of thyroid disorder  Pain is located lateral   The patient rates the pain as a 4/10  The pain has been present for 5 years  The patient sustained an injury approx 5 years ago  The mechanism of injury was throwing a spindle of wires overhead  The pain is characterized as sharp  The pain is present daily  Pain is improved by rest   Pain is aggravated by overhead activity, reaching back and exercising  Symptoms include clicking  The patient denies weakness  The patient denies numbness and tingling  The patient has tried rest, ice and NSAIDS            Shoulder Surgical History:  None    Past Medical, Social and Family History:  Past Medical History:   Diagnosis Date    Achrochordon     Resolved 3/25/2016     Genital warts     Resolved 11/22/2014     Neoplasm of uncertain behavior of skin     Resolved 3/25/2016     Numbness of left anterior thigh     Resolved 6/29/2016      History reviewed  No pertinent surgical history  Allergies   Allergen Reactions    Penicillins      No current outpatient medications on file prior to visit  No current facility-administered medications on file prior to visit  Social History     Socioeconomic History    Marital status: /Civil Union     Spouse name: Not on file    Number of children: Not on file    Years of education: Not on file    Highest education level: Not on file   Occupational History    Not on file   Tobacco Use    Smoking status: Never Smoker    Smokeless tobacco: Never Used   Vaping Use    Vaping Use: Never used   Substance and Sexual Activity    Alcohol use: Yes     Alcohol/week: 1 0 standard drink     Types: 1 Glasses of wine per week    Drug use: No    Sexual activity: Yes     Partners: Female   Other Topics Concern    Not on file   Social History Narrative    Former smoker - As per Bear Erika      Social Determinants of Health     Financial Resource Strain: Not on file   Food Insecurity: Not on file   Transportation Needs: Not on file   Physical Activity: Not on file   Stress: Not on file   Social Connections: Not on file   Intimate Partner Violence: Not on file   Housing Stability: Not on file         I have reviewed the past medical, surgical, social and family history, medications and allergies as documented in the EMR  Review of systems: ROS is negative other than that noted in the HPI  Constitutional: Negative for fatigue and fever  HENT: Negative for sore throat  Respiratory: Negative for shortness of breath  Cardiovascular: Negative for chest pain  Gastrointestinal: Negative for abdominal pain  Endocrine: Negative for cold intolerance and heat intolerance     Genitourinary: Negative for flank pain  Musculoskeletal: Negative for back pain  Skin: Negative for rash  Allergic/Immunologic: Negative for immunocompromised state  Neurological: Negative for dizziness  Psychiatric/Behavioral: Negative for agitation  Physical Exam:    Blood pressure 142/88, height 5' 9" (1 753 m), weight 91 2 kg (201 lb)      General/Constitutional: NAD, well developed, well nourished  HENT: Normocephalic, atraumatic  CV: Intact distal pulses, regular rate  Resp: No respiratory distress or labored breathing  Lymphatic: No lymphadenopathy palpated  Neuro: Alert and Oriented x 3, no focal deficits  Psych: Normal mood, normal affect, normal judgement, normal behavior  Skin: Warm, dry, no rashes, no erythema      Shoulder focused exam:       RIGHT LEFT    Scapula Atrophy Negative Negative     Winging Negative Negative     Protraction Negative Negative    Rotator cuff SS 5/5 5/5     IS 5/5 5/5     SubS 5/5 5/5    ROM     170     ER0 60 60     ER90 90    90     IR90 T2    T6     IRb L1    T6    TTP: AC Negative Negative     Biceps Negative Negative     Coracoid Negative Negative    Special Tests: O'Briens Negative Negative     Mcarthur-shear Positive Negative     Cross body Adduction Positive Not Applicable     Speeds  Negative Not Applicable     Eitan's Negative Not Applicable     Whipple Negative, + pain Not Applicable       Neer Negative Not Applicable     Teresa Positive Not Applicable    Instability: Apprehension & relocation negative not tested     Load & shift negative not tested    Other: Crank Negative Not Applicable                 UE NV Exam: +2 Radial pulses bilaterally  Sensation intact to light touch C5-T1 bilaterally, Radial/median/ulnar nerve motor intact      Bilateral elbow, wrist, and and forearm ROM full, painless with passive ROM, no ttp or crepitance throughout extremities below shoulder joint    Cervical ROM is full without pain, numbness or tingling      Shoulder Imaging    X-rays of the right shoulder were reviewed, which demonstrate calcific tendonitis, no significant degenerative changes  I have reviewed the radiology report and agree with their impression         Scribe Attestation    I,:   am acting as a scribe while in the presence of the attending physician :       I,:   personally performed the services described in this documentation    as scribed in my presence :

## 2022-05-23 NOTE — PATIENT INSTRUCTIONS
Calcific tendonitis of right rotator cuff   May need Needling and lavage to break up calcium deposit

## 2022-06-21 ENCOUNTER — HOSPITAL ENCOUNTER (OUTPATIENT)
Dept: MRI IMAGING | Facility: HOSPITAL | Age: 47
Discharge: HOME | End: 2022-06-21
Payer: COMMERCIAL

## 2022-06-21 ENCOUNTER — HOSPITAL ENCOUNTER (OUTPATIENT)
Dept: RADIOLOGY | Facility: HOSPITAL | Age: 47
Discharge: HOME | End: 2022-06-21

## 2022-06-21 DIAGNOSIS — M75.31 CALCIFIC TENDONITIS OF RIGHT SHOULDER: ICD-10-CM

## 2022-06-21 DIAGNOSIS — S05.40XA FOREIGN BODY BEHIND THE EYE, UNSPECIFIED LATERALITY: ICD-10-CM

## 2022-06-21 PROCEDURE — G1004 CDSM NDSC: HCPCS

## 2022-06-21 PROCEDURE — 73221 MRI JOINT UPR EXTREM W/O DYE: CPT

## 2022-08-08 ENCOUNTER — OFFICE VISIT (OUTPATIENT)
Dept: FAMILY MEDICINE CLINIC | Facility: CLINIC | Age: 47
End: 2022-08-08
Payer: COMMERCIAL

## 2022-08-08 VITALS
WEIGHT: 196 LBS | SYSTOLIC BLOOD PRESSURE: 122 MMHG | HEART RATE: 97 BPM | HEIGHT: 69 IN | OXYGEN SATURATION: 99 % | BODY MASS INDEX: 29.03 KG/M2 | TEMPERATURE: 97.6 F | DIASTOLIC BLOOD PRESSURE: 86 MMHG

## 2022-08-08 DIAGNOSIS — Z13.220 SCREENING FOR LIPID DISORDERS: ICD-10-CM

## 2022-08-08 DIAGNOSIS — Z11.4 SCREENING FOR HIV (HUMAN IMMUNODEFICIENCY VIRUS): ICD-10-CM

## 2022-08-08 DIAGNOSIS — Z11.1 SCREENING FOR TUBERCULOSIS: ICD-10-CM

## 2022-08-08 DIAGNOSIS — Z00.00 ANNUAL PHYSICAL EXAM: Primary | ICD-10-CM

## 2022-08-08 DIAGNOSIS — F41.9 ANXIETY: ICD-10-CM

## 2022-08-08 DIAGNOSIS — Z11.59 NEED FOR HEPATITIS C SCREENING TEST: ICD-10-CM

## 2022-08-08 DIAGNOSIS — Z12.12 SCREENING FOR COLORECTAL CANCER: ICD-10-CM

## 2022-08-08 DIAGNOSIS — Z12.11 SCREENING FOR COLORECTAL CANCER: ICD-10-CM

## 2022-08-08 DIAGNOSIS — R53.83 FATIGUE, UNSPECIFIED TYPE: ICD-10-CM

## 2022-08-08 DIAGNOSIS — Z13.29 SCREENING FOR THYROID DISORDER: ICD-10-CM

## 2022-08-08 PROCEDURE — 99396 PREV VISIT EST AGE 40-64: CPT | Performed by: FAMILY MEDICINE

## 2022-08-08 PROCEDURE — 3725F SCREEN DEPRESSION PERFORMED: CPT | Performed by: FAMILY MEDICINE

## 2022-08-08 RX ORDER — ALPRAZOLAM 0.5 MG/1
0.5 TABLET ORAL ONCE AS NEEDED
Qty: 1 TABLET | Refills: 0 | Status: SHIPPED | OUTPATIENT
Start: 2022-08-08

## 2022-08-08 NOTE — ASSESSMENT & PLAN NOTE
Patient with fatigue  He is requesting screen for testosterone level  We will rule out other causes of his symptoms  Follow up if symptoms worsen
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
## 2022-08-08 NOTE — ASSESSMENT & PLAN NOTE
Patient with anxiety prior to venipuncture  He may take 1 tablet of xanax 30 min prior to procedure

## 2022-08-09 ENCOUNTER — APPOINTMENT (OUTPATIENT)
Dept: LAB | Facility: CLINIC | Age: 47
End: 2022-08-09
Payer: COMMERCIAL

## 2022-08-09 DIAGNOSIS — Z11.4 SCREENING FOR HIV (HUMAN IMMUNODEFICIENCY VIRUS): ICD-10-CM

## 2022-08-09 DIAGNOSIS — R53.83 FATIGUE, UNSPECIFIED TYPE: ICD-10-CM

## 2022-08-09 DIAGNOSIS — Z11.1 SCREENING FOR TUBERCULOSIS: ICD-10-CM

## 2022-08-09 DIAGNOSIS — Z13.220 SCREENING FOR LIPID DISORDERS: ICD-10-CM

## 2022-08-09 DIAGNOSIS — Z11.59 NEED FOR HEPATITIS C SCREENING TEST: ICD-10-CM

## 2022-08-09 LAB
ALBUMIN SERPL BCP-MCNC: 4.2 G/DL (ref 3.5–5)
ALP SERPL-CCNC: 75 U/L (ref 46–116)
ALT SERPL W P-5'-P-CCNC: 45 U/L (ref 12–78)
ANION GAP SERPL CALCULATED.3IONS-SCNC: 3 MMOL/L (ref 4–13)
AST SERPL W P-5'-P-CCNC: 22 U/L (ref 5–45)
BASOPHILS # BLD AUTO: 0.03 THOUSANDS/ΜL (ref 0–0.1)
BASOPHILS NFR BLD AUTO: 1 % (ref 0–1)
BILIRUB SERPL-MCNC: 0.72 MG/DL (ref 0.2–1)
BUN SERPL-MCNC: 21 MG/DL (ref 5–25)
CALCIUM SERPL-MCNC: 9.4 MG/DL (ref 8.3–10.1)
CHLORIDE SERPL-SCNC: 105 MMOL/L (ref 96–108)
CHOLEST SERPL-MCNC: 204 MG/DL
CO2 SERPL-SCNC: 28 MMOL/L (ref 21–32)
CREAT SERPL-MCNC: 1.11 MG/DL (ref 0.6–1.3)
EOSINOPHIL # BLD AUTO: 0.11 THOUSAND/ΜL (ref 0–0.61)
EOSINOPHIL NFR BLD AUTO: 2 % (ref 0–6)
ERYTHROCYTE [DISTWIDTH] IN BLOOD BY AUTOMATED COUNT: 12.1 % (ref 11.6–15.1)
GFR SERPL CREATININE-BSD FRML MDRD: 79 ML/MIN/1.73SQ M
GLUCOSE P FAST SERPL-MCNC: 105 MG/DL (ref 65–99)
HCT VFR BLD AUTO: 51.7 % (ref 36.5–49.3)
HCV AB SER QL: NORMAL
HDLC SERPL-MCNC: 58 MG/DL
HGB BLD-MCNC: 17.3 G/DL (ref 12–17)
IMM GRANULOCYTES # BLD AUTO: 0.02 THOUSAND/UL (ref 0–0.2)
IMM GRANULOCYTES NFR BLD AUTO: 0 % (ref 0–2)
LDLC SERPL CALC-MCNC: 104 MG/DL (ref 0–100)
LYMPHOCYTES # BLD AUTO: 1.33 THOUSANDS/ΜL (ref 0.6–4.47)
LYMPHOCYTES NFR BLD AUTO: 21 % (ref 14–44)
MCH RBC QN AUTO: 30.7 PG (ref 26.8–34.3)
MCHC RBC AUTO-ENTMCNC: 33.5 G/DL (ref 31.4–37.4)
MCV RBC AUTO: 92 FL (ref 82–98)
MONOCYTES # BLD AUTO: 0.61 THOUSAND/ΜL (ref 0.17–1.22)
MONOCYTES NFR BLD AUTO: 10 % (ref 4–12)
NEUTROPHILS # BLD AUTO: 4.24 THOUSANDS/ΜL (ref 1.85–7.62)
NEUTS SEG NFR BLD AUTO: 66 % (ref 43–75)
NRBC BLD AUTO-RTO: 0 /100 WBCS
PLATELET # BLD AUTO: 166 THOUSANDS/UL (ref 149–390)
PMV BLD AUTO: 10.9 FL (ref 8.9–12.7)
POTASSIUM SERPL-SCNC: 4.2 MMOL/L (ref 3.5–5.3)
PROT SERPL-MCNC: 7.7 G/DL (ref 6.4–8.4)
RBC # BLD AUTO: 5.63 MILLION/UL (ref 3.88–5.62)
SODIUM SERPL-SCNC: 136 MMOL/L (ref 135–147)
TRIGL SERPL-MCNC: 209 MG/DL
TSH SERPL DL<=0.05 MIU/L-ACNC: 2.97 UIU/ML (ref 0.45–4.5)
WBC # BLD AUTO: 6.34 THOUSAND/UL (ref 4.31–10.16)

## 2022-08-09 PROCEDURE — 84403 ASSAY OF TOTAL TESTOSTERONE: CPT

## 2022-08-09 PROCEDURE — 80053 COMPREHEN METABOLIC PANEL: CPT

## 2022-08-09 PROCEDURE — 36415 COLL VENOUS BLD VENIPUNCTURE: CPT

## 2022-08-09 PROCEDURE — 87389 HIV-1 AG W/HIV-1&-2 AB AG IA: CPT

## 2022-08-09 PROCEDURE — 86803 HEPATITIS C AB TEST: CPT

## 2022-08-09 PROCEDURE — 80061 LIPID PANEL: CPT

## 2022-08-09 PROCEDURE — 84443 ASSAY THYROID STIM HORMONE: CPT

## 2022-08-09 PROCEDURE — 84402 ASSAY OF FREE TESTOSTERONE: CPT

## 2022-08-09 PROCEDURE — 86480 TB TEST CELL IMMUN MEASURE: CPT

## 2022-08-09 PROCEDURE — 85025 COMPLETE CBC W/AUTO DIFF WBC: CPT

## 2022-08-10 LAB
GAMMA INTERFERON BACKGROUND BLD IA-ACNC: 0.02 IU/ML
HIV 1+2 AB+HIV1 P24 AG SERPL QL IA: NORMAL
M TB IFN-G BLD-IMP: NEGATIVE
M TB IFN-G CD4+ BCKGRND COR BLD-ACNC: 0.01 IU/ML
M TB IFN-G CD4+ BCKGRND COR BLD-ACNC: 0.01 IU/ML
MITOGEN IGNF BCKGRD COR BLD-ACNC: >10 IU/ML
TESTOST FREE SERPL-MCNC: 24.4 PG/ML (ref 6.8–21.5)
TESTOST SERPL-MCNC: 391 NG/DL (ref 264–916)

## 2022-08-11 ENCOUNTER — TELEPHONE (OUTPATIENT)
Dept: FAMILY MEDICINE CLINIC | Facility: CLINIC | Age: 47
End: 2022-08-11

## 2022-08-15 DIAGNOSIS — R79.89 LOW TESTOSTERONE IN MALE: ICD-10-CM

## 2022-08-15 DIAGNOSIS — R53.83 FATIGUE, UNSPECIFIED TYPE: Primary | ICD-10-CM

## 2023-03-17 ENCOUNTER — APPOINTMENT (OUTPATIENT)
Dept: RADIOLOGY | Facility: MEDICAL CENTER | Age: 48
End: 2023-03-17

## 2023-03-17 ENCOUNTER — OFFICE VISIT (OUTPATIENT)
Dept: OBGYN CLINIC | Facility: MEDICAL CENTER | Age: 48
End: 2023-03-17

## 2023-03-17 VITALS
SYSTOLIC BLOOD PRESSURE: 137 MMHG | HEIGHT: 69 IN | WEIGHT: 197.6 LBS | BODY MASS INDEX: 29.27 KG/M2 | HEART RATE: 106 BPM | DIASTOLIC BLOOD PRESSURE: 96 MMHG

## 2023-03-17 DIAGNOSIS — M75.31 CALCIFIC TENDONITIS OF RIGHT SHOULDER: ICD-10-CM

## 2023-03-17 DIAGNOSIS — M25.511 CHRONIC RIGHT SHOULDER PAIN: ICD-10-CM

## 2023-03-17 DIAGNOSIS — G89.29 CHRONIC RIGHT SHOULDER PAIN: ICD-10-CM

## 2023-03-17 DIAGNOSIS — M75.31 CALCIFIC TENDONITIS OF RIGHT SHOULDER: Primary | ICD-10-CM

## 2023-03-17 NOTE — PROGRESS NOTES
Ortho Sports Medicine Shoulder Follow Up Visit     Assesment:   56 y o  male right shoulder calcific tendinitis    Plan:    Patient's findings and exam are consistent with right shoulder calcific tendinitis  We did discuss 2 treatment options which would include a cortisone injection in the office during today's visit, which would provide temporary relief of his symptoms to help with pain and inflammation  The second treatment option would include a needling and lavage procedure, which would help break up a calcific tendinitis  The patient wishes to proceed with the second option and a referral to interventional radiology for needling and lavage procedure of his right shoulder with Dr Angel Young was given during today's visit  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  I discussed with him in the meantime he can continue to use ice, heat, and over-the-counter medication for pain relief  I also reviewed a home exercise program with him during today's visit for shoulder, scapular, and rotator cuff strengthening range of motion exercises  He can follow-up in approximately 3 months for reevaluation  Conservative treatment:    Ice to shoulder 1-2 times daily, for 20 minutes at a time  Home exercise program for shoulder, including ROM and strenthening  Instructions given to patient of what exercises to perform  Let pain guide return to activities  Imaging: All imaging from today was reviewed by myself and explained to the patient  Injection:    Referral to interventional radiology for needling lavage procedure was given during today's visit      Surgery:     No surgery is recommended at this point, continue with conservative treatment plan as noted  Follow up:    Return in about 3 months (around 6/17/2023) for Recheck        Chief Complaint   Patient presents with   • Right Shoulder - Follow-up         History of Present Illness: The patient is returns for follow up of right shoulder pain, that has been ongoing for almost 1 year now, which she attributes to a overhead throwing injury 5 years prior to that  Since the prior visit, He reports minimal improvement  He is right-hand dominant  He was previously seen in May and June of 2022 for right shoulder calcific tendinitis  He states that his shoulder has never fully gotten better as he continues to experience anterior and lateral shoulder pain  He mentions that certain activities such as reaching behind his back or going to pick something up do increase his pain, which at that time he rates a 6 out of 10  He states since he was previously seen in 2022 he has not done any treatment including cortisone injection or physical therapy as he was transitioning between jobs at that time  He states that his pain is alleviated with rest and over-the-counter medication  He does mention that certain activities of daily living are progressive becoming harder to perform  He denies any new injury or trauma to his shoulder  He denies any numbness or tingling  Shoulder Surgical History:  None    Past Medical, Social and Family History:  Past Medical History:   Diagnosis Date   • Achrochordon     Resolved 3/25/2016    • Anxiety 8/8/2022   • Genital warts     Resolved 11/22/2014    • Neoplasm of uncertain behavior of skin     Resolved 3/25/2016    • Numbness of left anterior thigh     Resolved 6/29/2016      History reviewed  No pertinent surgical history    Allergies   Allergen Reactions   • Penicillins      Current Outpatient Medications on File Prior to Visit   Medication Sig Dispense Refill   • NON FORMULARY Medical Marijuana- Vapes     • ALPRAZolam (XANAX) 0 5 mg tablet Take 1 tablet (0 5 mg total) by mouth once as needed for anxiety (before procedure) for up to 1 dose (Patient not taking: Reported on 3/17/2023) 1 tablet 0     No current facility-administered medications on file prior to visit  Social History     Socioeconomic History   • Marital status: /Civil Union     Spouse name: Not on file   • Number of children: Not on file   • Years of education: Not on file   • Highest education level: Not on file   Occupational History   • Not on file   Tobacco Use   • Smoking status: Former     Types: Cigarettes     Start date:      Quit date:      Years since quittin 2   • Smokeless tobacco: Never   Vaping Use   • Vaping Use: Every day   • Substances: THC   Substance and Sexual Activity   • Alcohol use: Yes     Alcohol/week: 1 0 standard drink     Types: 1 Glasses of wine per week   • Drug use: No   • Sexual activity: Yes     Partners: Female   Other Topics Concern   • Not on file   Social History Narrative    Former smoker - As per [facility]      Social Determinants of Health     Financial Resource Strain: Not on file   Food Insecurity: Not on file   Transportation Needs: Not on file   Physical Activity: Not on file   Stress: Not on file   Social Connections: Not on file   Intimate Partner Violence: Not on file   Housing Stability: Not on file       I have reviewed the past medical, surgical, social and family history, medications and allergies as documented in the EMR  Review of systems: ROS is negative other than that noted in the HPI  Constitutional: Negative for fatigue and fever  Physical Exam:    Blood pressure 137/96, pulse (!) 106, height 5' 9" (1 753 m), weight 89 6 kg (197 lb 9 6 oz)      General/Constitutional: NAD, well developed, well nourished  HENT: Normocephalic, atraumatic  CV: Intact distal pulses, regular rate  Resp: No respiratory distress or labored breathing  GI: Soft and non-tender   Lymphatic: No lymphadenopathy palpated  Neuro: Alert and Oriented x 3, no focal deficits  Psych: Normal mood, normal affect, normal judgement, normal behavior  Skin: Warm, dry, no rashes, no erythema      Shoulder focused exam:       RIGHT LEFT    Scapula Atrophy Negative Negative     Winging Negative Negative     Protraction Negative Negative    Rotator cuff SS 5/5 5/5     IS 5/5 5/5     SubS 5/5 5/5    ROM     170     ER0 60 60     ER90 90    90     IR90 T6    T6     IRb T6    T6    TTP: AC Positive Negative     Biceps Negative Negative     Coracoid Negative Negative    Special Tests: O'Briens Negative Negative     Mcarthur-shear Negative Negative     Cross body Adduction Negative Negative     Speeds  Negative Negative     Eitan's Negative Negative     Whipple Negative Negative       Neer Positive Negative     Teresa Positive Negative    Instability: Apprehension & relocation not tested not tested     Load & shift not tested not tested    Other: Crank Negative Negative               UE NV Exam: +2 Radial pulses bilaterally  Sensation intact to light touch C5-T1 bilaterally, Radial/median/ulnar nerve motor intact    Cervical ROM is full without pain, numbness or tingling      Shoulder Imaging    No imaging was performed today and New Xrays were performed today of the Right shoulder and demonstrate calcific tendinitis with no significant degenerative changes  There was no radiology report available for review, but the report will be assessed when it is available  MRI of the right shoulder performed on 6/21/2022 was reviewed during today's visit, which demonstrates calcific tendinitis measuring 6 x 9 mm within the supraspinatus tendon around the acromion process  There is no evidence of high-grade or full-thickness rotator cuff tear  No evidence of biceps tenosynovitis or tearing  I agree with the radiologist interpretation        Scribe Attestation    I,:  Ernesto Alvarado MD am acting as a scribe while in the presence of the attending physician :       I,:  Miguel Pa DO personally performed the services described in this documentation    as scribed in my presence :

## 2023-05-10 ENCOUNTER — OFFICE VISIT (OUTPATIENT)
Dept: FAMILY MEDICINE CLINIC | Facility: CLINIC | Age: 48
End: 2023-05-10

## 2023-05-10 VITALS
OXYGEN SATURATION: 99 % | BODY MASS INDEX: 28 KG/M2 | HEIGHT: 70 IN | WEIGHT: 195.6 LBS | DIASTOLIC BLOOD PRESSURE: 90 MMHG | HEART RATE: 95 BPM | TEMPERATURE: 98.5 F | SYSTOLIC BLOOD PRESSURE: 124 MMHG

## 2023-05-10 DIAGNOSIS — S46.211A STRAIN OF RIGHT BICEPS, INITIAL ENCOUNTER: Primary | ICD-10-CM

## 2023-05-10 NOTE — PROGRESS NOTES
"Name: Isadora Runner      : 1975      MRN: [id number]  Encounter Provider: Shasha Skinner DO  Encounter Date: 5/10/2023   Encounter department: [address] [place] [address]  Strain of right biceps, initial encounter     Probable right biceps strain  Injury slowly improving  No additional treatment required  Recommend that he can restart his workouts gradually  He should use half his normal amount of weight and half the normal number of repetitions and advance slowly over the next 2 to 4 weeks  Subjective      Patient presents with a chief complaint of right arm pain  Approximately 3 weeks ago while working out at [employer] he was doing pull-ups and had a sudden onset of pain at his distal bicep  That pain has gradually slowly improved over the course of 3 weeks  He now grades it at 3 out of 10    Review of Systems   Constitutional: Negative  Respiratory: Negative  Cardiovascular: Negative  Gastrointestinal: Negative  Genitourinary: Negative  Musculoskeletal: Negative  Right distal bicep pain   Psychiatric/Behavioral: Negative  Current Outpatient Medications on File Prior to Visit   Medication Sig   • NON FORMULARY Medical Marijuana- Vapes   • ALPRAZolam (XANAX) 0 5 mg tablet Take 1 tablet (0 5 mg total) by mouth once as needed for anxiety (before procedure) for up to 1 dose (Patient not taking: Reported on 3/17/2023)       Objective     /90 (BP Location: Right arm, Patient Position: Sitting, Cuff Size: Adult)   Pulse 95   Temp 98 5 °F (36 9 °C)   Ht 5' 10\" (1 778 m)   Wt 88 7 kg (195 lb 9 6 oz)   SpO2 99%   BMI 28 07 kg/m²     Physical Exam  Musculoskeletal:      Comments: No reproducible tenderness on exam of bicep  No palpable defect         Shasha Skinner DO  "

## 2023-08-30 ENCOUNTER — OFFICE VISIT (OUTPATIENT)
Dept: FAMILY MEDICINE CLINIC | Facility: CLINIC | Age: 48
End: 2023-08-30
Payer: COMMERCIAL

## 2023-08-30 VITALS
WEIGHT: 199.6 LBS | SYSTOLIC BLOOD PRESSURE: 120 MMHG | HEART RATE: 75 BPM | OXYGEN SATURATION: 97 % | DIASTOLIC BLOOD PRESSURE: 86 MMHG | TEMPERATURE: 98.2 F | BODY MASS INDEX: 28.64 KG/M2

## 2023-08-30 DIAGNOSIS — S46.209A INJURY OF TENDON OF BICEPS: Primary | ICD-10-CM

## 2023-08-30 PROCEDURE — 99213 OFFICE O/P EST LOW 20 MIN: CPT | Performed by: FAMILY MEDICINE

## 2023-08-30 NOTE — PROGRESS NOTES
Name: Melisa Hensley      : 1975      MRN: [id number]  Encounter Provider: George Mejias DO  Encounter Date: 2023   Encounter department: [address] [place] [address]     1. Injury of tendon of biceps  -     MRI humerus right wo contrast; Future; Expected date: 2023  -     Ambulatory referral to Orthopedic Surgery; Future        Distal bicep region tenderness. Persistent over 3-1/2 months. Check MRI and refer to orthopedics. Subjective      Patient presents to follow-up on his right arm injury. He injured his biceps in late April/early May. Was seen in the office in May with a complaint of distal biceps region pain. He was diagnosed with biceps tendon strain advised to rest and slowly really start activities over the course of 2 to 4 weeks. He notes that over the course of the last 3 months he has continued to have symptoms including pain and some weakness in his upper arm. Incidentally he also has right shoulder calcific tendinitis as well. Review of Systems   Musculoskeletal: Positive for myalgias (Distal bicep pain). Current Outpatient Medications on File Prior to Visit   Medication Sig   • ALPRAZolam (XANAX) 0.5 mg tablet Take 1 tablet (0.5 mg total) by mouth once as needed for anxiety (before procedure) for up to 1 dose (Patient not taking: Reported on 3/17/2023)   • NON FORMULARY Medical Marijuana- Vapes (Patient not taking: Reported on 2023)       Objective     /86 (BP Location: Left arm, Patient Position: Sitting, Cuff Size: Adult)   Pulse 75   Temp 98.2 °F (36.8 °C)   Wt 90.5 kg (199 lb 9.6 oz)   SpO2 97%   BMI 28.64 kg/m²     Physical Exam  Musculoskeletal:         General: Tenderness (Distal bicep) present.        George Mejias DO

## 2023-09-13 ENCOUNTER — HOSPITAL ENCOUNTER (OUTPATIENT)
Dept: MRI IMAGING | Facility: HOSPITAL | Age: 48
Discharge: HOME | End: 2023-09-13

## 2023-09-13 DIAGNOSIS — S46.209A INJURY OF TENDON OF BICEPS: ICD-10-CM

## 2023-09-26 ENCOUNTER — TELEPHONE (OUTPATIENT)
Dept: FAMILY MEDICINE CLINIC | Facility: CLINIC | Age: 48
End: 2023-09-26

## 2023-09-26 NOTE — TELEPHONE ENCOUNTER
Hi, this is Alex Savage. I was supposed to get an MRI last week, but the wrong MRI was ordered. Doctor Kelvin Oh was supposed to order one from my elbow for attendant on my bicep, and the one that they got at [facility] was for my entire arm. They tried calling your office a number of times. They were never able to get through. Nobody ever got back to them. So I kind of wasted a day going to get an MRI that couldn't be done. I don't know what needs to happen next. If the order has to be changed so I can reschedule, please give me a call back at [phone]. Thanks.  Bye.

## 2023-10-19 ENCOUNTER — APPOINTMENT (OUTPATIENT)
Dept: RADIOLOGY | Facility: MEDICAL CENTER | Age: 48
End: 2023-10-19
Payer: COMMERCIAL

## 2023-10-19 ENCOUNTER — OFFICE VISIT (OUTPATIENT)
Dept: OBGYN CLINIC | Facility: MEDICAL CENTER | Age: 48
End: 2023-10-19

## 2023-10-19 VITALS
HEIGHT: 70 IN | WEIGHT: 196 LBS | BODY MASS INDEX: 28.06 KG/M2 | SYSTOLIC BLOOD PRESSURE: 138 MMHG | DIASTOLIC BLOOD PRESSURE: 99 MMHG | HEART RATE: 92 BPM

## 2023-10-19 DIAGNOSIS — S46.209A INJURY OF TENDON OF BICEPS: ICD-10-CM

## 2023-10-19 DIAGNOSIS — M25.521 PAIN IN RIGHT ELBOW: ICD-10-CM

## 2023-10-19 DIAGNOSIS — M25.521 PAIN IN RIGHT ELBOW: Primary | ICD-10-CM

## 2023-10-19 PROCEDURE — 73080 X-RAY EXAM OF ELBOW: CPT

## 2023-10-19 NOTE — PROGRESS NOTES
Orthopedic Surgery Office Note  Gold Bourgeois (1 y.o. male)   : 1975   MRN: [id number]   Encounter Date: 10/19/2023  Dr. Marietta Alfaro DO, Orthopedic Surgeon  [facility]   Chief Complaint   Patient presents with   • Right Elbow - Pain       Assessment / Plan  Diagnoses and all orders for this visit:    Pain in right elbow  -     XR elbow 3+ vw right; Future  -     MRI elbow right wo contrast; Future  -     Ambulatory Referral to Physical Therapy; Future    Injury of tendon of biceps  -     Ambulatory referral to Orthopedic Surgery  -     MRI elbow right wo contrast; Future  -     Ambulatory Referral to Physical Therapy; Future    Other orders  -     ibuprofen (MOTRIN) 100 mg/5 mL suspension; Take by mouth every 6 (six) hours as needed for mild pain        Discussion:   Discussed with patient that he has symptoms of biceps tendonitis. I would expect this to have improved by now with rest and conservative management but his pain has progressed and is now daily and interfering with his activities of daily living. Plan for MRI right elbow to evaluate distal biceps tendon for tendinosis vs partial tear. Tendon is palpable on exam so not completely torn. Treatment pending further imaging. Concurrently will order PT for distal biceps tendonitis. Surgery:   No surgery planned at this time    Plan:   No follow-ups on file. History of Present Illness:  Gold Bourgeois is a 53 y.o. male who presents right elbow pain. Pain started 5 months ago during a pullup session, felt something tear in his right elbow. Has had some level of pain since. Saw pcp initially felt to be tendonitis or biceps strain but did not improve with time or tylenol/motrin so he presents today upon referral from his PCP Dr. Tim Rogers. Pain is aching worse with use or direct contact to antecubital fossa two finger breadths above. No radiation no numbness tingling weakness.  This pain has been limiting his ability to exercise. He has not tried any physical therapy yet. Denies relevant medical or surgical history. Review of Systems  Constitutional: Negative for fatigue, fever or loss of appetite. HENT: Negative. Respiratory: Negative for shortness of breath, dyspnea. Cardiovascular: Negative for chest pain/tightness. Gastrointestinal: Negative for abdominal pain, N/V. Endocrine: Negative for cold/heat intolerance, unexplained weight loss/gain. Genitourinary: Negative for flank pain, dysuria  Musculoskeletal: Positive for arthralgia   Skin: Negative for rash. Neurological: Negative for numbness or tingling  Psychiatric/Behavioral: Negative for agitation. Physical Exam  /99   Pulse 92   Ht 5' 10" (1.778 m)   Wt 88.9 kg (196 lb)   BMI 28.12 kg/m²   Cons: Appears well. No apparent distress. Psych: Alert. Oriented x3. Mood and affect normal.  Eyes: PERRLA, EOMI  Resp: Normal effort. No audible wheezing or stridor. CV: Extremities warm and well perfused. No LE edema. Abd:    No distention or guarding. Skin: Warm. No visible lesions. Neuro: Normal muscle tone. Orthopedic Exam:   Inspection: No ecchymosis or deformity  Palpation: TTP over distal biceps tendon. Hook test negative for biceps tendon complete tear  ROM: Normal right elbow full extension to 160 flexion    Studies Reviewed  Study: XR Right elbow  Date: 10/19/2023  Report: No radiologist report was available at this time. I have personally reviewed imaging and my impression is as follows: No fracture or dislocation, no significant elbow arthritis. Procedures  No procedures today. Medical, Surgical, Family, and Social History  The patient's medical history, family history, and social history, were reviewed and updated as appropriate.     Past Medical History:   Diagnosis Date   • Achrochordon     Resolved 3/25/2016    • Anxiety 8/8/2022   • Genital warts     Resolved 11/22/2014    • Neoplasm of uncertain behavior of skin     Resolved 3/25/2016    • Numbness of left anterior thigh     Resolved 2016      No past surgical history on file. Family History   Problem Relation Age of Onset   • No Known Problems Mother    • No Known Problems Father    • No Known Problems Brother      Social History     Occupational History   • Not on file   Tobacco Use   • Smoking status: Former     Packs/day: 0.25     Years: 4.00     Total pack years: 1.00     Types: Cigarettes     Start date:      Quit date:      Years since quittin.8   • Smokeless tobacco: Never   Vaping Use   • Vaping Use: Some days   • Start date: 5/10/2018   • Substances: Nicotine, THC   Substance and Sexual Activity   • Alcohol use:  Yes     Alcohol/week: 1.0 standard drink of alcohol     Types: 1 Glasses of wine per week   • Drug use: No   • Sexual activity: Yes     Partners: Female     Allergies   Allergen Reactions   • Penicillins        Current Outpatient Medications:   •  ibuprofen (MOTRIN) 100 mg/5 mL suspension, Take by mouth every 6 (six) hours as needed for mild pain, Disp: , Rfl:   •  ALPRAZolam (XANAX) 0.5 mg tablet, Take 1 tablet (0.5 mg total) by mouth once as needed for anxiety (before procedure) for up to 1 dose (Patient not taking: Reported on 3/17/2023), Disp: 1 tablet, Rfl: 0  •  NON FORMULARY, Medical Marijuana- Vapes (Patient not taking: Reported on 2023), Disp: , Rfl:     30 minutes was spent in the coordination of care, reviewing of imaging and with the patient on the date of service    Criss Armenta MD    Scribe Attestation    I,:   am acting as a scribe while in the presence of the attending physician.:       I,:   personally performed the services described in this documentation    as scribed in my presence.:

## 2023-11-01 ENCOUNTER — APPOINTMENT (OUTPATIENT)
Dept: PHYSICAL THERAPY | Facility: CLINIC | Age: 48
End: 2023-11-01
Payer: COMMERCIAL

## 2023-11-01 NOTE — PROGRESS NOTES
PT Evaluation     Today's date: 2023  Patient name: Jameson Delgado  : 1975  MRN: [id number]  Referring provider: Reji Jain MD  Dx: No diagnosis found. Assessment  Assessment details: Jameson Delgado is a 53 y.o. male presenting to physical therapy with right elbow pain and presents with pain, decreased strength, and decreased activity tolerance. Assessment reveals. Secondary to these impairments, patient has increased difficulty performing ADL's, household chores, and  work related tasks. Simona Ny would benefit from skilled PT to address these issues and maximize function. Thank you for the referral.    Impairments: abnormal or restricted ROM, abnormal movement, activity intolerance, impaired physical strength, pain with function and scapular dyskinesis  Understanding of Dx/Px/POC: excellent  Goals  STG (4 weeks)  1. Patient will be independent with HEP  2. Decrease pain at worst by 2 points on NPRS  3. Increase right elbow AROM to full and pain free with extension  4. Patient will demonstrate ability to   LTG (8 weeks)  1. Decrease pain at worst from 4 points on NPRS  2. Increase passive/active range of motion from X degrees to > or equal to X degrees  3. Increase X strength by 1 MMT grade  4. Patient will demonstrate ability to  5. Increase FOTO score > or equal to expected outcome    Plan  Patient would benefit from: skilled PT  Planned therapy interventions: joint mobilization, manual therapy, neuromuscular re-education, patient education, postural training, strengthening, stretching, therapeutic exercise, home exercise program and ADL training  Frequency: 2x week  Duration in weeks: 8  Treatment plan discussed with: patient    Subjective Evaluation    History of Present Illness  Mechanism of injury: Patient reports to outpatient PT secondary to the onset of right elbow pain. Patient states that the pain began when he felt pain during a pull up workout about 6 months prior. Patient describes the pain as sharp and achy which is worse with lifting activity and improved with rest.  Patient works as ____ and notes difficulty with work duties, ADL's and leisure functions as a result. Patients goals for PT are to decrease the pain and return to PLOF. Not a recurrent problem   Quality of life: excellent    Patient Goals  Patient goals for therapy: increased strength, independence with ADLs/IADLs, return to sport/leisure activities, increased motion and decreased pain    Pain  Current pain ratin  At best pain ratin  At worst pain ratin  Quality: dull ache and sharp  Relieving factors: rest and relaxation  Aggravating factors: lifting  Progression: worsening    Social Support    Employment status: working  Hand dominance: right      Diagnostic Tests  Abnormal MRI: ordered.   Treatments  Previous treatment: medication  Current treatment: medication      Objective           Precautions: Standard      Manuals                                                                 Neuro Re-Ed                                                                                                        Ther Ex                                                                                                                     Ther Activity                                       Gait Training                                       Modalities

## 2023-11-06 ENCOUNTER — EVALUATION (OUTPATIENT)
Dept: PHYSICAL THERAPY | Facility: CLINIC | Age: 48
End: 2023-11-06
Payer: COMMERCIAL

## 2023-11-06 DIAGNOSIS — S46.209A INJURY OF TENDON OF BICEPS: ICD-10-CM

## 2023-11-06 DIAGNOSIS — M25.521 PAIN IN RIGHT ELBOW: ICD-10-CM

## 2023-11-06 PROCEDURE — 97161 PT EVAL LOW COMPLEX 20 MIN: CPT | Performed by: PHYSICAL THERAPIST

## 2023-11-06 PROCEDURE — 97035 APP MDLTY 1+ULTRASOUND EA 15: CPT | Performed by: PHYSICAL THERAPIST

## 2023-11-06 NOTE — PROGRESS NOTES
PT Evaluation     Today's date: 2023  Patient name: Kellee Bravo  : 1975  MRN: [id number]  Referring provider: Stephanie Zaragoza DO  Dx:   Encounter Diagnosis     ICD-10-CM    1. Injury of tendon of biceps  S46.209A Ambulatory Referral to Physical Therapy      2. Pain in right elbow  M25.521 Ambulatory Referral to Physical Therapy                     Assessment  Assessment details: Patient is a 53 y.o. male who  presents with pain,  weakness associated with R distal biceps tendinitis, likely partial tearing. Has functional limitations as a result of impairments. Patient would benefit from course of skilled physical therapy to address above listed impairments in an effort to improve function. .  Understanding of Dx/Px/POC: good   Prognosis: good    Goals  Goals  Short Term Goals:    1) Pain: Decrease pain to 2/10 at worst x 1 continuous week within 3 weeks  2) Strength: Increase R elbow strength at least 1/2 grade for all noted as weak within 3 weeks. Cassandra Sor 3) Function: Improved FOTO self rated functional score (57@ IE), patient to note greater ease with ADLs within 3 weeks. Long Term Goals:    1) Pain:Eliminate  pain x 1 continuous week within 6 weeks. 2) Strength:5/5 R UE strength within 6 weeks. 3) Function: Improved FOTO self rated functional score to at least 71, no difficulty with ADLs as they relate to Relbow within 6 weeks. 4) I with HEP      Plan  Patient would benefit from: skilled physical therapy  Planned modality interventions: TENS, ultrasound, cryotherapy, thermotherapy: hydrocollator packs and low level laser therapy  Other planned modality interventions: all prn  Planned therapy interventions: stretching, strengthening, therapeutic exercise, home exercise program, manual therapy, massage, joint mobilization, patient education and ADL training  Other planned therapy interventions: personalized blood flow restriction therapy  Frequency: 1-2x. week.   Duration in weeks: 6  Treatment plan discussed with: patient        Subjective Evaluation    History of Present Illness  Mechanism of injury: Patient is a 53 y.o. old male who presents for an initial outpatient physical therapy consultation regarding his R elbow    Late May 2023 was going to the gym 3-4 times per week. Decided one day to do assisted pull ups to  failure. After clsoe to 20 reps, felt like something wasn't right in area of distal biceps tendon. Tried some ice and advil with some improvement, but then this plateaued. Overall feels he recovered about 50% before progress came to a halt. He consulted with PCP. MRI ordered but was  denied by insurance. Was referred to ortho. Saw Dr. Yoselin Lerner on 10/19/23  X-rays (-)  MRI ordered and again denied. Referred for course of physical therapy. "Everyting I do with my elbow hurts"    Brushing teeth, driving, dressing, painful. Also notes about 5 year history of R shoulder pain associated with calcific tendinitis. Is considering debridement. Patient Goals  Patient goals for therapy: decreased pain, increased strength, independence with ADLs/IADLs and return to sport/leisure activities    Exercise history: has tried to keep up wit his walking to stay active, but has not been lifting weights. Objective     Tenderness     Right Elbow   Tenderness in the distal biceps tendon. Right Wrist/Hand   Tenderness in the distal biceps tendon.      Active Range of Motion     Right Elbow   Flexion: WFL  Extension: WFL and with pain  Forearm supination: WFL and with pain  Forearm pronation: WFL    Passive Range of Motion   Left Shoulder   Internal rotation 90°: 65 degrees     Right Shoulder   Flexion: WFL  Abduction: WFL  External rotation 90°: WFL  Internal rotation 90°: 5 degrees with pain    Strength/Myotome Testing     Right Shoulder     Planes of Motion   Flexion: 4+ (pain)   Abduction: 4+ (pain)   External rotation at 0°: 4+   Internal rotation at 0°: 5     Right Elbow   Flexion: 4+ (pain)  Forearm supination: 4 (pain)  Forearm pronation: 4+    General Comments:      Elbow Comments   No swelling noted at R distal biceps tendon             Precautions: none      Manuals 11/6/23            Direct contact low level laser  Elbow tendinitis protocol 12 W   6 min, 40 sec                                                   Neuro Re-Ed                                                                                                        Ther Ex             Eccentric biceps 3# x 15 (added to HEP)                         PBFR with eccentric biceps NV                                                                             Ther Activity                                       Gait Training                                       Modalities             CUS R distal biceps tendon  8 min, 1.5 w/cm sq RG            IE/HEP RG

## 2023-11-08 ENCOUNTER — TELEPHONE (OUTPATIENT)
Age: 48
End: 2023-11-08

## 2023-11-08 NOTE — TELEPHONE ENCOUNTER
Caller: Patient    Doctor: Bridger Sihdu    Reason for call: Patient is calling to ask how long Dr Bridger Sidhu wants him in PT to get his MRI approved. Also, should he schedule a follow up after he's done with PT?     Call back#: [phone]

## 2023-11-13 ENCOUNTER — APPOINTMENT (OUTPATIENT)
Dept: PHYSICAL THERAPY | Facility: CLINIC | Age: 48
End: 2023-11-13
Payer: COMMERCIAL

## 2023-11-13 ENCOUNTER — OFFICE VISIT (OUTPATIENT)
Dept: FAMILY MEDICINE CLINIC | Facility: CLINIC | Age: 48
End: 2023-11-13
Payer: COMMERCIAL

## 2023-11-13 VITALS
SYSTOLIC BLOOD PRESSURE: 138 MMHG | OXYGEN SATURATION: 98 % | BODY MASS INDEX: 29.07 KG/M2 | DIASTOLIC BLOOD PRESSURE: 90 MMHG | WEIGHT: 202.6 LBS | TEMPERATURE: 98.3 F | HEART RATE: 73 BPM

## 2023-11-13 DIAGNOSIS — A63.0 CONDYLOMA: Primary | ICD-10-CM

## 2023-11-13 PROCEDURE — 17110 DESTRUCTION B9 LES UP TO 14: CPT | Performed by: FAMILY MEDICINE

## 2023-11-13 RX ORDER — IBUPROFEN 200 MG
TABLET ORAL EVERY 6 HOURS PRN
COMMUNITY

## 2023-11-13 NOTE — PROGRESS NOTES
Lesion Destruction    Date/Time: 11/13/2023 9:45 AM    Performed by: Pati Cohen DO  Authorized by: Pati Cohen DO  Universal Protocol:  Consent: Verbal consent obtained. Risks and benefits: risks, benefits and alternatives were discussed  Consent given by: patient  Patient understanding: patient states understanding of the procedure being performed  Patient identity confirmed: verbally with patient    Procedure Details - Lesion Destruction:     Number of Lesions:  2  Lesion 1:     Body area: Anogenital    Anogenital location:  Penis    Initial size (mm):  2    Final defect size (mm):  3    Malignancy: benign lesion      Destruction method: cryotherapy      Extensive destruction required?: No    Lesion 2:     Body area: Anogenital    Anogenital location:  Penis    Initial size (mm):  2    Final defect size (mm):  3    Malignancy: benign lesion      Destruction method: cryotherapy      Extensive destruction required?: No       2 condyloma (small) treated with liquid nitrogen. Follow-up care instructions provided. Patient has been treated multiple times in the past for same issue.

## 2023-11-15 ENCOUNTER — OFFICE VISIT (OUTPATIENT)
Dept: PHYSICAL THERAPY | Facility: CLINIC | Age: 48
End: 2023-11-15
Payer: COMMERCIAL

## 2023-11-15 DIAGNOSIS — S46.209A INJURY OF TENDON OF BICEPS: Primary | ICD-10-CM

## 2023-11-15 DIAGNOSIS — M25.521 PAIN IN RIGHT ELBOW: ICD-10-CM

## 2023-11-15 PROCEDURE — 97140 MANUAL THERAPY 1/> REGIONS: CPT

## 2023-11-15 PROCEDURE — 97110 THERAPEUTIC EXERCISES: CPT

## 2023-11-15 NOTE — PROGRESS NOTES
Daily Note     Today's date: 11/15/2023  Patient name: Sherly Yepez  : 1975  MRN: [id number]  Referring provider: Fredy Morillo MD  Dx:   Encounter Diagnosis     ICD-10-CM    1. Injury of tendon of biceps  S46.209A       2. Pain in right elbow  M25.521                      Subjective: Patient reported helping with fencing this weekend and did not feel bad during but later on had increased pain in R elbow. Has been compliant with eccentric strengthening. Objective: See treatment diary below. After discussions of benefits and risks, precautions and contraindications, patient elected to participate in personalized blood flow restriction training. Assessment: After discussing use of BFR and benefits, along with achieving PTP, patient opted out of use as he was feeling some lightheadedness. Good eccentric control with biceps strengthening. Passive modalities utilized for pain control to aid in reduction of decreasing inflammation. Will plan to use BFR NV if patient agrees to help maximize strength gains through low load training. Plan: Continue per plan of care. Progress treatment as tolerated.            Precautions: none    Manuals 11/6/23 11/15           Direct contact low level laser  Elbow tendinitis protocol 12 W   6 min, 40 sec 12 W - EH                                                    Neuro Re-Ed                                                                                                        Ther Ex             Eccentric biceps 3# x 15 (added to HEP) 3#  2x15           BFR - red cuff  173/  55           PBFR with eccentric biceps NV def                                                                            Ther Activity                                       Gait Training                                       Modalities             CUS R distal biceps tendon  8 min, 1.5 w/cm sq RG EH           IE/HEP RG

## 2023-11-22 ENCOUNTER — OFFICE VISIT (OUTPATIENT)
Dept: PHYSICAL THERAPY | Facility: CLINIC | Age: 48
End: 2023-11-22
Payer: COMMERCIAL

## 2023-11-22 DIAGNOSIS — S46.209A INJURY OF TENDON OF BICEPS: Primary | ICD-10-CM

## 2023-11-22 DIAGNOSIS — M25.521 PAIN IN RIGHT ELBOW: ICD-10-CM

## 2023-11-22 PROCEDURE — 97110 THERAPEUTIC EXERCISES: CPT

## 2023-11-22 PROCEDURE — 97140 MANUAL THERAPY 1/> REGIONS: CPT

## 2023-11-22 NOTE — PROGRESS NOTES
Daily Note     Today's date: 2023  Patient name: Javan Davila  : 1975  MRN: [id number]  Referring provider: Raul Salas MD  Dx:   Encounter Diagnosis     ICD-10-CM    1. Injury of tendon of biceps  S46.209A       2. Pain in right elbow  M25.521                      Subjective: Patient continues to be compliant with HEP. Still aggravation with certain activities throughout the day. Goal is to return to gym routine. Does feel modalities have been helping with slight decrease in pain. Objective: See treatment diary below. After discussions of benefits and risks, precautions and contraindications, patient elected to participate in personalized blood flow restriction training. Assessment: Attempted BFR this visit but deferred following attempt of two reps due to feeling uncomfortable with cuff. Good eccentric control with progressing biceps strengthening. Passive modalities utilized for pain control to aid in reduction of decreasing inflammation, showing a positive response with overall slight reduction in pain since last session. Plan: Continue per plan of care. Progress treatment as tolerated.            Precautions: none    Manuals 11/6/23 11/15 11/22          Direct contact low level laser  Elbow tendinitis protocol 12 W   6 min, 40 sec 12 W - EH   12 W - EH                                                 Neuro Re-Ed                                                                                                        Ther Ex             Eccentric biceps 3# x 15 (added to HEP) 3#  2x15 5#  3x10          BFR - red cuff  173/  55 PTP to 55          PBFR with eccentric biceps NV def Attempted but deferred                                                                           Ther Activity                                       Gait Training                                       Modalities             CUS R distal biceps tendon  8 min, 1.5 w/cm sq RG EH EH          IE/HEP RG

## 2023-11-30 ENCOUNTER — APPOINTMENT (OUTPATIENT)
Dept: PHYSICAL THERAPY | Facility: CLINIC | Age: 48
End: 2023-11-30
Payer: COMMERCIAL

## 2023-12-06 ENCOUNTER — APPOINTMENT (OUTPATIENT)
Dept: PHYSICAL THERAPY | Facility: CLINIC | Age: 48
End: 2023-12-06
Payer: COMMERCIAL

## 2023-12-07 ENCOUNTER — OFFICE VISIT (OUTPATIENT)
Dept: PHYSICAL THERAPY | Facility: CLINIC | Age: 48
End: 2023-12-07
Payer: COMMERCIAL

## 2023-12-07 DIAGNOSIS — S46.209A INJURY OF TENDON OF BICEPS: Primary | ICD-10-CM

## 2023-12-07 DIAGNOSIS — M25.521 PAIN IN RIGHT ELBOW: ICD-10-CM

## 2023-12-07 PROCEDURE — 97110 THERAPEUTIC EXERCISES: CPT

## 2023-12-07 PROCEDURE — 97140 MANUAL THERAPY 1/> REGIONS: CPT

## 2023-12-07 NOTE — PROGRESS NOTES
Daily Note     Today's date: 2023  Patient name: Vicky Rodas  : 1975  MRN: [id number]  Referring provider: Viktor Rivera MD  Dx:   Encounter Diagnosis     ICD-10-CM    1. Injury of tendon of biceps  S46.209A       2. Pain in right elbow  M25.521                      Subjective: Patient finds that he continues to do too much to aggravate his elbow. Objective: See treatment diary below. Program progressed as reflected below. Assessment: UE strengthening added as reflected below with good tolerance. Continued use of modalities for decreasing pain and to aid in pain modulation. Low level laser treatment terminated prior to end of allotted time given "stinging" sensation. Plan: Continue per plan of care. Progress treatment as tolerated.            Precautions: none    Manuals 11/6/23 11/15 11/22 12/7         Direct contact low level laser  Elbow tendinitis protocol 12 W   6 min, 40 sec 12 W - EH   12 W - EH 12 W - EH                                                Neuro Re-Ed                                                                                                        Ther Ex             Eccentric biceps 3# x 15 (added to HEP) 3#  2x15 5#  3x10 7#  3x10         BFR - red cuff  173/  55 PTP to 55          PBFR with eccentric biceps NV def Attempted but deferred D/C         UBE for ROM    4 min         Chest press    10#  3x10         Triceps press    10#  3x10                                   Ther Activity                                       Gait Training                                       Modalities             CUS R distal biceps tendon  8 min, 1.5 w/cm sq RG [facility] EH EH         IE/HEP RG

## 2023-12-21 ENCOUNTER — OFFICE VISIT (OUTPATIENT)
Dept: PHYSICAL THERAPY | Facility: CLINIC | Age: 48
End: 2023-12-21
Payer: COMMERCIAL

## 2023-12-21 DIAGNOSIS — M25.521 PAIN IN RIGHT ELBOW: ICD-10-CM

## 2023-12-21 DIAGNOSIS — S46.209A INJURY OF TENDON OF BICEPS: Primary | ICD-10-CM

## 2023-12-21 PROCEDURE — 97110 THERAPEUTIC EXERCISES: CPT | Performed by: PHYSICAL THERAPIST

## 2023-12-21 PROCEDURE — 97035 APP MDLTY 1+ULTRASOUND EA 15: CPT | Performed by: PHYSICAL THERAPIST

## 2023-12-21 NOTE — PROGRESS NOTES
Progress  Note     Today's date: 2023  Patient name: Oleg Dc  : 1975  MRN: [id number]  Referring provider: Jose Hanley MD  Dx:   Encounter Diagnosis     ICD-10-CM    1. Injury of tendon of biceps  S46.209A       2. Pain in right elbow  M25.521              Stop Time: 1740        Assessment  Assessment details: Patient is a 48 y.o. male who  presented to physical therapy initially with  with pain,  weakness associated with R distal biceps tendinitis, likely partial tearing.  Since starting therapy pt has made the following progress towards goals:  1) Decreased pain  2) Improved range of motion  3) Improved strength    Making progress, still with functional limitations as a result of pain.  Recommend continued short course of therapy, see updated goals below.       Understanding of Dx/Px/POC: good   Prognosis: good     Goals  Goals  Short Term Goals:     1) Pain: Decrease pain to 2/10 at worst x 1 continuous week within 3 weeks-progress made  2) Strength: Increase R elbow strength at least 1/2 grade for all noted as weak within 3 weeks.-met  3) Function: Improved FOTO self rated functional score (57@ IE), patient to note greater ease with ADLs within 3 weeks.-progress made     Long Term Goals:     1) Pain:Eliminate  pain x 1 continuous week within 6 weeks.-not yet met  2) Strength:5/5 R UE strength within 6 weeks.-not yet met  3) Function: Improved FOTO self rated functional score to at least 71, no difficulty with ADLs as they relate to [place] within 6 weeks.-not yet met  4) I with HEP-not yet met        Plan  Patient would benefit from: skilled physical therapy  Planned modality interventions: TENS, ultrasound, cryotherapy, thermotherapy: hydrocollator packs and low level laser therapy  Other planned modality interventions: all prn  Planned therapy interventions: stretching, strengthening, therapeutic exercise, home exercise program, manual therapy, massage, joint mobilization, patient  "education and ADL training  Other planned therapy interventions: personalized blood flow restriction therapy  Frequency: 1-2x.week.  Duration in weeks: 4  Treatment plan discussed with: patient           Subjective Evaluation     History of Present Illness  Mechanism of injury: Patient is a 48 y.o. old male who presents for an initial outpatient physical therapy consultation regarding his R elbow     Late May 2023 was going to the gym 3-4 times per week.   Decided one day to do assisted pull ups to  failure. After clsoe to 20 reps, felt like something wasn't right in area of distal biceps tendon.  Tried some ice and advil with some improvement, but then this plateaued. Overall feels he recovered about 50% before progress came to a halt.        He consulted with PCP. MRI ordered but was  denied by insurance.     Was referred to ortho.  Saw Dr. Javi Jimenez on 10/19/23  X-rays (-)  MRI ordered and again denied.  Referred for course of physical therapy.        \"Everyting I do with my elbow hurts\"     Brushing teeth, driving, dressing, painful.        Also notes about 5 year history of R shoulder pain associated with calcific tendinitis. Is considering debridement.        UPDATE 12/21/23: Overall noting some improvement in elbow pain. Notes 2-3/10 for the past 48 hours.  Is trying not to stress elbow as much with activities at home.              Patient Goals: making some progress towards personal goals.  Patient goals for therapy: decreased pain, increased strength, independence with ADLs/IADLs and return to sport/leisure activities                 Objective      Tenderness      Right Elbow   Tenderness in the distal biceps tendon.      Right Wrist/Hand   Tenderness in the distal biceps tendon.      Active Range of Motion      Right Elbow   Flexion: WFL  Extension: WFL   Forearm supination: WFL   Forearm pronation: WFL      Strength/Myotome Testing      Right Shoulder      Planes of Motion   Flexion: 5      Right " Elbow   Flexion: 5  Forearm supination: 4+ (pain)       General Comments:        Elbow Comments   No swelling noted at R distal biceps tendon       Precautions: none    Manuals 11/6/23 11/15 11/22 12/7 12/21        Direct contact low level laser  Elbow tendinitis protocol 12 W   6 min, 40 sec 12 W - EH   12 W - EH 12 W - EH D/C                                               Neuro Re-Ed                                                                                                        Ther Ex             Eccentric biceps 3# x 15 (added to HEP) 3#  2x15 5#  3x10 7#  3x10 7#  3 x 10        BFR - red cuff  173/  55 PTP to 55          PBFR with eccentric biceps NV def Attempted but deferred D/C         UBE for ROM    4 min 3/3   7m 0-80 rpm         Chest press    10#  3x10 10#  3 x 10        Triceps press    10#  3x10 10#  2 x 10        Skull busters     5#  2 x 10                     Ther Activity                                       Gait Training                                       Modalities             CUS R distal biceps tendon  8 min, 1.5 w/cm sq RG EH EH EH RG        IE/HEP RG

## 2024-01-08 ENCOUNTER — OFFICE VISIT (OUTPATIENT)
Dept: PHYSICAL THERAPY | Facility: CLINIC | Age: 49
End: 2024-01-08
Payer: COMMERCIAL

## 2024-01-08 DIAGNOSIS — M25.521 PAIN IN RIGHT ELBOW: ICD-10-CM

## 2024-01-08 DIAGNOSIS — S46.209A INJURY OF TENDON OF BICEPS: Primary | ICD-10-CM

## 2024-01-08 PROCEDURE — 97035 APP MDLTY 1+ULTRASOUND EA 15: CPT | Performed by: PHYSICAL THERAPIST

## 2024-01-08 PROCEDURE — 97110 THERAPEUTIC EXERCISES: CPT | Performed by: PHYSICAL THERAPIST

## 2024-01-08 NOTE — PROGRESS NOTES
Daily Note     Today's date: 2024  Patient name: Oleg Dc  : 1975  MRN: [id number]  Referring provider: Jose Hanley MD  Dx:   Encounter Diagnosis     ICD-10-CM    1. Injury of tendon of biceps  S46.209A       2. Pain in right elbow  M25.521                      Subjective: Feels about 70% better. Did some shoveling yesterday and could feels some tightness and pain in R distal biceps. No sharp pains.    Also reports doing some electric and duct work recently in attic, using R arm frequently without thinking much about it.         Objective: See treatment diary below.  Progressed UE strengthening program.        Assessment: Tolerated treatment well. Making subjective and objective progress.      Plan: Continue per plan of care.      Precautions: none    Manuals 11/6/23 11/15 11/22 12/7 12/21 1/8       Direct contact low level laser  Elbow tendinitis protocol 12 W   6 min, 40 sec 12 W - EH   12 W - EH 12 W - EH D/C                                               Neuro Re-Ed                                                                                                        Ther Ex             Eccentric biceps 3# x 15 (added to HEP) 3#  2x15 5#  3x10 7#  3x10 7#  3 x 10 8#  2x 10       Concentric hammer curls      5#  2 x 10       BFR - red cuff  173/  55 PTP to 55          PBFR with eccentric biceps NV def Attempted but deferred D/C         UBE for ROM    4 min 3/3   7m 0-80 rpm UBE2  3/3  L2.   77-80 RPM        Chest press    10#  3x10 10#  3 x 10 10#  2  15       Triceps press    10#  3x10 10#  2 x 10 10#  2 x 15       Skull busters     5#  2 x 10 6#  2 x 15       Self biceps stretch at rail      30sec x 3       AROM scaption      3#  2 x 10       Therabar supination      Green  2 x 10       Therabar pronation      Green  2 x 10       Manual R biceps stretch      30sec x 4       Ther Activity                                       Gait Training                                       Modalities              CUS R distal biceps tendon  8 min, 1.5 w/cm sq RG EH EH EH RG RG       IE/HEP RG

## 2024-01-15 ENCOUNTER — OFFICE VISIT (OUTPATIENT)
Dept: PHYSICAL THERAPY | Facility: CLINIC | Age: 49
End: 2024-01-15
Payer: COMMERCIAL

## 2024-01-15 DIAGNOSIS — M25.521 PAIN IN RIGHT ELBOW: ICD-10-CM

## 2024-01-15 DIAGNOSIS — S46.209A INJURY OF TENDON OF BICEPS: Primary | ICD-10-CM

## 2024-01-15 PROCEDURE — 97110 THERAPEUTIC EXERCISES: CPT | Performed by: PHYSICAL THERAPIST

## 2024-01-15 PROCEDURE — 97035 APP MDLTY 1+ULTRASOUND EA 15: CPT | Performed by: PHYSICAL THERAPIST

## 2024-01-15 NOTE — PROGRESS NOTES
Daily Note     Today's date: 1/15/2024  Patient name: Oleg Dc  : 1975  MRN: [id number]  Referring provider: Jose Hanley MD  Dx:   Encounter Diagnosis     ICD-10-CM    1. Injury of tendon of biceps  S46.209A       2. Pain in right elbow  M25.521                      Subjective: Feels a pervasive low level R distal biceps pain. He though he'd be farther along in his recovery by now.        Objective: See treatment diary below.  Progressed UE strengthening program.      Assessment: Tolerated treatment well. I think Oleg is recovering, it just has been slow.   Has been able to tolerate higher weight for strengthening program, has been able to do more around the house.        Plan: Continue per plan of care. Advised him if he truly feels he has reached a plateau in his recovery to let me know and we will hold on therapy.     Precautions: none    Manuals 11/6/23 11/15 11/22 12/7 12/21 1/8 1/15      Direct contact low level laser  Elbow tendinitis protocol 12 W   6 min, 40 sec 12 W - EH   12 W - EH 12 W - EH D/C                                               Neuro Re-Ed                                                                                                        Ther Ex             Eccentric biceps 3# x 15 (added to HEP) 3#  2x15 5#  3x10 7#  3x10 7#  3 x 10 8#  2x 10 9#  2 x 10      Concentric hammer curls      5#  2 x 10 8#  2 x 10      BFR - red cuff  173/  55 PTP to 55          PBFR with eccentric biceps NV def Attempted but deferred D/C         UBE for ROM    4 min 3/3   7m 0-80 rpm UBE2  3/3  L2.   77-80 RPM UBE2  3.3  L2.5, no > 90 degrees       Chest press    10#  3x10 10#  3 x 10 10#  2  15 10#  2x 15      Triceps press    10#  3x10 10#  2 x 10 10#  2 x 15 10#  2 x 15      Skull busters     5#  2 x 10 6#  2 x 15 10#  2 x 10      Self biceps stretch at rail      30sec x 3 30sec x 4      AROM scaption      3#  2 x 10 3#  2 x 15      Therabar supination      Green  2 x 10 Green  2x  15   Date: 12/6/2020    Time: 12:08 AM    Patient Placed On BIPAP/CPAP/ Non-Invasive Ventilation? Yes    If no must comment. Facial area red/color change? No           If YES are Blister/Lesion present? No   If yes must notify nursing staff  BIPAP/CPAP skin barrier? No    Skin barrier type:na       Comments: Pt placed on BiPAP for the night. Pt asking to try total face mask, skin barrier not needed.         Lizbet Jan 12/05/20 6862   NIV Type   Mode Bilevel   Mask Type Total face   Settings/Measurements   IPAP 12 cmH20   CPAP/EPAP 6 cmH2O   Rate Ordered 12   Resp 26   FiO2  70 %   Vt Exhaled 961 mL   Minute Volume 24.7 Liters   Mask Leak (lpm) 25 lpm   Comfort Level Good   Using Accessory Muscles No   SpO2 91     Therabar pronation      Green  2 x 10 Green  2  14      Manual R biceps stretch      30sec x 4                    Ther Activity                                       Gait Training                                       Modalities             CUS R distal biceps tendon  8 min, 1.5 w/cm sq RG EH EH EH RG RG RG      IE/HEP RG

## 2024-01-22 ENCOUNTER — OFFICE VISIT (OUTPATIENT)
Dept: PHYSICAL THERAPY | Facility: CLINIC | Age: 49
End: 2024-01-22
Payer: COMMERCIAL

## 2024-01-22 DIAGNOSIS — M25.521 PAIN IN RIGHT ELBOW: ICD-10-CM

## 2024-01-22 DIAGNOSIS — S46.209A INJURY OF TENDON OF BICEPS: Primary | ICD-10-CM

## 2024-01-22 PROCEDURE — 97110 THERAPEUTIC EXERCISES: CPT | Performed by: PHYSICAL THERAPIST

## 2024-01-22 PROCEDURE — 97035 APP MDLTY 1+ULTRASOUND EA 15: CPT | Performed by: PHYSICAL THERAPIST

## 2024-01-22 NOTE — PROGRESS NOTES
Daily Note     Today's date: 2024  Patient name: Oleg Dc  : 1975  MRN: [id number]  Referring provider: Jose Hanley MD  Dx:   Encounter Diagnosis     ICD-10-CM    1. Injury of tendon of biceps  S46.209A       2. Pain in right elbow  M25.521                        Subjective: Less severe R elbow pain since last visit      Objective: See treatment diary below.  Progressed UE strengthening program.      Assessment: Intermittent, mild pains with therex, nothing concerning. Advised him it is important to gradually load tendon during recovery.Patient would benefit from continued course of skilled physical therapy to address impairments in an effort to improve function.          Plan: Continue per plan of care.      Precautions: none    Manuals 11/6/23 11/15 11/22 12/7 12/21 1/8 1/15 1/22     Direct contact low level laser  Elbow tendinitis protocol 12 W   6 min, 40 sec 12 W - EH   12 W - EH 12 W - EH D/C                                               Neuro Re-Ed                                                                                                        Ther Ex             Eccentric biceps 3# x 15 (added to HEP) 3#  2x15 5#  3x10 7#  3x10 7#  3 x 10 8#  2x 10 9#  2 x 10 9#  2 x 12     Concentric hammer curls      5#  2 x 10 8#  2 x 10 8#  2 x 12     BFR - red cuff  173/  55 PTP to 55          PBFR with eccentric biceps NV def Attempted but deferred D/C         UBE for ROM    4 min 3/3   7m 0-80 rpm UBE2  3/3  L2.   77-80 RPM UBE2  3.3  L2.5, no > 90 degrees UBE2  3/3  L2.5 no > 90 rpm      Chest press    10#  3x10 10#  3 x 10 10#  2  15 10#  2x 15 10#  2 x 15     Triceps press    10#  3x10 10#  2 x 10 10#  2 x 15 10#  2 x 15 10#  2 x 15     Skull busters     5#  2 x 10 6#  2 x 15 10#  2 x 10 10#  2 x 15     Self biceps stretch at rail      30sec x 3 30sec x 4 30se x4     AROM scaption      3#  2 x 10 3#  2 x 15 3#  2 x 15     Therabar supination      Green  2 x 10 Green  2x  15 Blue x  10  Green x 15     Therabar pronation      Green  2 x 10 Green  2  14 Blue x 10  Green x 15     Manual R biceps stretch      30sec x 4  30sex x 3     Body Blade        15sec x 4horizontal at 90 degrees  15sec x 3 in 90-90 position     Dynagrip netting        X 2 min     Ther Activity                                       Gait Training                                       Modalities             CUS R distal biceps tendon  8 min, 1.5 w/cm sq RG EH EH EH RG RG RG RG     IE/HEP RG

## 2024-01-29 ENCOUNTER — APPOINTMENT (OUTPATIENT)
Dept: PHYSICAL THERAPY | Facility: CLINIC | Age: 49
End: 2024-01-29
Payer: COMMERCIAL

## 2024-02-01 ENCOUNTER — OFFICE VISIT (OUTPATIENT)
Dept: PHYSICAL THERAPY | Facility: CLINIC | Age: 49
End: 2024-02-01
Payer: COMMERCIAL

## 2024-02-01 DIAGNOSIS — S46.209A INJURY OF TENDON OF BICEPS: Primary | ICD-10-CM

## 2024-02-01 DIAGNOSIS — M25.521 PAIN IN RIGHT ELBOW: ICD-10-CM

## 2024-02-01 PROCEDURE — 97035 APP MDLTY 1+ULTRASOUND EA 15: CPT

## 2024-02-01 PROCEDURE — 97110 THERAPEUTIC EXERCISES: CPT

## 2024-02-01 NOTE — PROGRESS NOTES
Daily Note     Today's date: 2024  Patient name: Oleg Dc  : 1975  MRN: [id number]  Referring provider: Jose Hanley MD  Dx:   Encounter Diagnosis     ICD-10-CM    1. Injury of tendon of biceps  S46.209A       2. Pain in right elbow  M25.521           Start Time: 1620  Stop Time: 1700  Total time in clinic (min): 40 minutes    Subjective: Oleg reports mild R pinching pain when holding a clip board today, but denies any pain currently. Reports normal muscle fatigue post last PT session.       Objective: See treatment diary below      Assessment: Oleg tolerated treatment well today with appropriate muscle fatigue experienced with ex's. He continue to make steady gains towards goals with improved R UE strength and ROM with decreased pain. Tolerated added BOSU pushups @ wall well, however cont to experience lat epi pain with resistive skull crushers. Required occ vc's t/o session for proper positioning with ex's. Oleg exhibited good technique with therapeutic exercises and would benefit from continued PT      Plan: Continue per plan of care.      Precautions: none    Manuals 11/6/23 11/15 11/22 12/7 12/21 1/8 1/15 1/22 2/1    Direct contact low level laser  Elbow tendinitis protocol 12 W   6 min, 40 sec 12 W - EH   12 W - EH 12 W - EH D/C                                               Neuro Re-Ed                                                                                                        Ther Ex             Eccentric biceps 3# x 15 (added to HEP) 3#  2x15 5#  3x10 7#  3x10 7#  3 x 10 8#  2x 10 9#  2 x 10 9#  2 x 12 9#/ 10# x10 ea     Concentric hammer curls      5#  2 x 10 8#  2 x 10 8#  2 x 12 10# 2x15    BFR - red cuff  173/  55 PTP to 55          PBFR with eccentric biceps NV def Attempted but deferred D/C         UBE for ROM    4 min 3/3   7m 0-80 rpm UBE2  3/3  L2.   77-80 RPM UBE2  3.3  L2.5, no > 90 degrees UBE2  3/3  L2.5 no > 90 rpm UBE2 3/3 L3 no>90 rpm      Chest  "press    10#  3x10 10#  3 x 10 10#  2  15 10#  2x 15 10#  2 x 15 10# 2x15     Triceps press    10#  3x10 10#  2 x 10 10#  2 x 15 10#  2 x 15 10#  2 x 15  10# 2x15     Skull busters     5#  2 x 10 6#  2 x 15 10#  2 x 10 10#  2 x 15 Held increased lat epi pain w/ ext    Self biceps stretch at rail      30sec x 3 30sec x 4 30se x4 30\"x4    AROM scaption      3#  2 x 10 3#  2 x 15 3#  2 x 15 NP    Therabar supination      Green  2 x 10 Green  2x  15 Blue x 10  Green x 15 Blue 2x10     Therabar pronation      Green  2 x 10 Green  2  14 Blue x 10  Green x 15 Blue 2x10     Manual R biceps stretch      30sec x 4  30sex x 3     Body Blade        15sec x 4horizontal at 90 degrees  15sec x 3 in 90-90 position X15\" x4 hor /90    Dynagrip netting        X 2 min X 2 min     BOSU push ups          @ wall 2x10     Ther Activity                                       Gait Training                                       Modalities         2/1    CUS R distal biceps tendon  8 min, 1.5 w/cm sq RG EH EH EH RG RG RG RG AAW    IE/HEP RG                         "

## 2024-02-08 ENCOUNTER — OFFICE VISIT (OUTPATIENT)
Dept: FAMILY MEDICINE CLINIC | Facility: CLINIC | Age: 49
End: 2024-02-08
Payer: COMMERCIAL

## 2024-02-08 VITALS
WEIGHT: 197 LBS | HEART RATE: 98 BPM | BODY MASS INDEX: 28.27 KG/M2 | RESPIRATION RATE: 18 BRPM | DIASTOLIC BLOOD PRESSURE: 88 MMHG | TEMPERATURE: 98.2 F | SYSTOLIC BLOOD PRESSURE: 124 MMHG | OXYGEN SATURATION: 100 %

## 2024-02-08 DIAGNOSIS — J01.00 ACUTE NON-RECURRENT MAXILLARY SINUSITIS: Primary | ICD-10-CM

## 2024-02-08 PROCEDURE — 99213 OFFICE O/P EST LOW 20 MIN: CPT | Performed by: FAMILY MEDICINE

## 2024-02-08 RX ORDER — METHYLPREDNISOLONE 4 MG/1
TABLET ORAL
Qty: 21 TABLET | Refills: 0 | Status: SHIPPED | OUTPATIENT
Start: 2024-02-08

## 2024-02-08 RX ORDER — CEFDINIR 300 MG/1
300 CAPSULE ORAL EVERY 12 HOURS SCHEDULED
Qty: 20 CAPSULE | Refills: 0 | Status: SHIPPED | OUTPATIENT
Start: 2024-02-08 | End: 2024-02-18

## 2024-02-08 NOTE — PROGRESS NOTES
Name: Oleg Dc      : 1975      MRN: [id number]  Encounter Provider: Kunal Martin DO  Encounter Date: 2024   Encounter department: [facility]    Assessment & Plan     1. Acute non-recurrent maxillary sinusitis  -     cefdinir (OMNICEF) 300 mg capsule; Take 1 capsule (300 mg total) by mouth every 12 (twelve) hours for 10 days  -     methylPREDNISolone 4 MG tablet therapy pack; Use as directed on package       Patient is in eighth  at [employer].  2-day history of cough nasal congestion and sinus pressure.  He is a non-smoker.  He has not received COVID booster or flu shot this season.  Will give prescription for cefdinir 300 twice daily along with Medrol Dosepak.  Patient can also use DayQuil/NyQuil as needed.  Drink plenty of fluids.  Call further problems.  Subjective     Patient is in eighth  at [employer].  2-day history of cough nasal congestion and sinus pressure.  He is a non-smoker.  He has not received COVID booster or flu shot this season.      Review of Systems   Constitutional:  Negative for chills and fever.   HENT:  Positive for congestion and postnasal drip.    Respiratory:  Positive for cough. Negative for shortness of breath.    Cardiovascular: Negative.        Past Medical History:   Diagnosis Date   • Achrochordon     Resolved 3/25/2016    • Anxiety 2022   • Genital warts     Resolved 2014    • Neoplasm of uncertain behavior of skin     Resolved 3/25/2016    • Numbness of left anterior thigh     Resolved 2016      History reviewed. No pertinent surgical history.  Family History   Problem Relation Age of Onset   • No Known Problems Mother    • No Known Problems Father    • No Known Problems Brother      Social History     Socioeconomic History   • Marital status: /Civil Union     Spouse name: None   • Number of children: None   • Years of education: None   • Highest education level: None   Occupational History    • None   Tobacco Use   • Smoking status: Former     Current packs/day: 0.00     Average packs/day: 0.3 packs/day for 4.0 years (1.0 ttl pk-yrs)     Types: Cigarettes     Start date:      Quit date: 2018     Years since quittin.1   • Smokeless tobacco: Never   Vaping Use   • Vaping status: Some Days   • Start date: 5/10/2018   • Substances: Nicotine, THC   Substance and Sexual Activity   • Alcohol use: Yes     Alcohol/week: 1.0 standard drink of alcohol     Types: 1 Glasses of wine per week   • Drug use: No   • Sexual activity: Yes     Partners: Female   Other Topics Concern   • None   Social History Narrative    Former smoker - As per [facility] [place]      Social Determinants of Health     Financial Resource Strain: Not on file   Food Insecurity: Not on file   Transportation Needs: Not on file   Physical Activity: Not on file   Stress: Not on file   Social Connections: Not on file   Intimate Partner Violence: Not on file   Housing Stability: Not on file     Current Outpatient Medications on File Prior to Visit   Medication Sig   • ALPRAZolam (XANAX) 0.5 mg tablet Take 1 tablet (0.5 mg total) by mouth once as needed for anxiety (before procedure) for up to 1 dose (Patient not taking: Reported on 3/17/2023)   • ibuprofen (MOTRIN) 100 mg/5 mL suspension Take by mouth every 6 (six) hours as needed for mild pain (Patient not taking: Reported on 2023)   • ibuprofen (MOTRIN) 200 mg tablet Take by mouth every 6 (six) hours as needed for mild pain (Patient not taking: Reported on 2024)   • NON FORMULARY Medical Marijuana- Vapes (Patient not taking: Reported on 2023)     Allergies   Allergen Reactions   • Penicillins      Immunization History   Administered Date(s) Administered   • COVID-19 PFIZER VACCINE 0.3 ML IM 2021, 2021   • Tdap 2012   • Tuberculin Skin Test-PPD Intradermal 2013, 2014       Objective     /88 (BP Location: Left arm, Patient Position: Sitting, Cuff  Size: Standard)   Pulse 98   Temp 98.2 °F (36.8 °C) (Temporal)   Resp 18   Wt 89.4 kg (197 lb)   SpO2 100%   BMI 28.27 kg/m²     Physical Exam  Constitutional:       Appearance: He is well-developed.   Pulmonary:      Effort: Pulmonary effort is normal.      Breath sounds: Normal breath sounds.   Musculoskeletal:      Cervical back: Normal range of motion and neck supple.       Kunal Martin DO

## 2024-02-22 ENCOUNTER — EVALUATION (OUTPATIENT)
Dept: PHYSICAL THERAPY | Facility: CLINIC | Age: 49
End: 2024-02-22
Payer: COMMERCIAL

## 2024-02-22 DIAGNOSIS — M25.521 PAIN IN RIGHT ELBOW: ICD-10-CM

## 2024-02-22 DIAGNOSIS — S46.209A INJURY OF TENDON OF BICEPS: Primary | ICD-10-CM

## 2024-02-22 PROCEDURE — 97035 APP MDLTY 1+ULTRASOUND EA 15: CPT | Performed by: PHYSICAL THERAPIST

## 2024-02-22 PROCEDURE — 97110 THERAPEUTIC EXERCISES: CPT | Performed by: PHYSICAL THERAPIST

## 2024-02-22 NOTE — PROGRESS NOTES
P.T. Discharge    Today's date: 2024  Patient name: Oleg Dc  : 1975  MRN: [id number]  Referring provider: Jose Hanley MD  Dx:   Encounter Diagnosis     ICD-10-CM    1. Injury of tendon of biceps  S46.209A       2. Pain in right elbow  M25.521             Start Time: 1222  Stop Time: 1310  Total time in clinic (min): 48 minutes  Assessment  Assessment details: Patient is a 48 y.o. male who  presented to physical therapy initially with  with pain,  weakness associated with R distal biceps tendinitis, likely partial tearing.  Since starting therapy pt has made the following progress towards goals:  1) Decreased pain  2) Improved range of motion  3) Improved strength  4) Improved functional use of R arm.    Still with mild unresolved pain.  At this time will resume graded gym workout and see if continued strengthening can help get him over the hump.  Only other medication intervention I can think of at this time would be platelet rich plasma injection, but he is going to proceed conservatively at this time.        Understanding of Dx/Px/POC: good   Prognosis: good     Goals  Goals  Short Term Goals:     1) Pain: Decrease pain to 2/10 at worst x 1 continuous week within 3 weeks-progress made  2) Strength: Increase R elbow strength at least 1/2 grade for all noted as weak within 3 weeks.-met  3) Function: Improved FOTO self rated functional score (57@ IE), patient to note greater ease with ADLs within 3 weeks-met     Long Term Goals:     1) Pain:Eliminate  pain x 1 continuous week within 6 weeks.-not yet met  2) Strength:5/5 R UE strength within 6 weeks.-met  3) Function: Improved FOTO self rated functional score to at least 71, no difficulty with ADLs as they relate to Relbow within 6 weeks.-not yet met  4) I with HEP-met        Plan  Advised patient to continue with home exercise and gym program. Advised patient to contact me with any future questions or concerns regarding exercise. Pt is in  "agreement with discharge plan.             Subjective Evaluation     History of Present Illness  Mechanism of injury: Patient is a 48 y.o. old male who presents for an initial outpatient physical therapy consultation regarding his R elbow     Late May 2023 was going to the gym 3-4 times per week.   Decided one day to do assisted pull ups to  failure. After clsoe to 20 reps, felt like something wasn't right in area of distal biceps tendon.  Tried some ice and advil with some improvement, but then this plateaued. Overall feels he recovered about 50% before progress came to a halt.        He consulted with PCP. MRI ordered but was  denied by insurance.     Was referred to ortho.  Saw Dr. Javi Jimenez on 10/19/23  X-rays (-)  MRI ordered and again denied.  Referred for course of physical therapy.        \"Everyting I do with my elbow hurts\"     Brushing teeth, driving, dressing, painful.        Also notes about 5 year history of R shoulder pain associated with calcific tendinitis. Is considering debridement.        UPDATE 12/21/23: Overall noting some improvement in elbow pain. Notes 2-3/10 for the past 48 hours.  Is trying not to stress elbow as much with activities at home.     UPDATE 2/22/24: Did some shoveling of snow over the weekend and this felt fine, then with 2-3/10 pervasive ache in R elbow yesterday without trauma. Overall noting some improvement, but wishes pain was gone.    Does express some apprehension about resuming all normal activities and gym program for fear of further injury, but also feels it is time to try a little bit more activitywith R arm(which I agree with).        Patient Goals: making some progress towards personal goals.  Patient goals for therapy: decreased pain, increased strength, independence with ADLs/IADLs and return to sport/leisure activities                 Objective      Tenderness      Right Elbow   Tenderness in the distal biceps tendon.            Active Range of Motion    " "  Right Elbow   Flexion: WFL  Extension: WFL   Forearm supination: WFL   Forearm pronation: WFL        Strength/Myotome Testing         Right Elbow   Flexion: 5  Forearm supination: 5 (pain)        Testing:   (R): 120#  (L): 110#    Precautions: none    Manuals 11/6/23 11/15 11/22 12/7 12/21 1/8 1/15 1/22 2/1 2/22   Direct contact low level laser  Elbow tendinitis protocol 12 W   6 min, 40 sec 12 W - EH   12 W - EH 12 W - EH D/C                                               Neuro Re-Ed                                                                                                        Ther Ex         2/1    Eccentric biceps 3# x 15 (added to HEP) 3#  2x15 5#  3x10 7#  3x10 7#  3 x 10 8#  2x 10 9#  2 x 10 9#  2 x 12 9#/ 10# x10 ea  10#   2 x 15   Concentric hammer curls      5#  2 x 10 8#  2 x 10 8#  2 x 12 10# 2x15 10#  2 x 10   BFR - red cuff  173/  55 PTP to 55          PBFR with eccentric biceps NV def Attempted but deferred D/C         UBE for ROM    4 min 3/3   7m 0-80 rpm UBE2  3/3  L2.   77-80 RPM UBE2  3.3  L2.5, no > 90 degrees UBE2  3/3  L2.5 no > 90 rpm UBE2 3/3 L3 no>90 rpm   UBE2  3/3  L3   Chest press    10#  3x10 10#  3 x 10 10#  2  15 10#  2x 15 10#  2 x 15 10# 2x15     Triceps press    10#  3x10 10#  2 x 10 10#  2 x 15 10#  2 x 15 10#  2 x 15  10# 2x15     Skull busters     5#  2 x 10 6#  2 x 15 10#  2 x 10 10#  2 x 15 Held increased lat epi pain w/ ext    Self biceps stretch at rail      30sec x 3 30sec x 4 30se x4 30\"x4 30sec x 4   AROM scaption      3#  2 x 10 3#  2 x 15 3#  2 x 15 NP 3#  2 x 15   Therabar supination      Green  2 x 10 Green  2x  15 Blue x 10  Green x 15 Blue 2x10  Blue  2 x 10   Therabar pronation      Green  2 x 10 Green  2  14 Blue x 10  Green x 15 Blue 2x10  Blue  2 x 10   Manual R biceps stretch      30sec x 4  30sex x 3     Body Blade        15sec x 4horizontal at 90 degrees  15sec x 3 in 90-90 position X15\" x4 hor /90 Large 15sec x 4 Horizontal at 90 flexion  Small " 15sec x 4 in 90-90 position   Dynagrip netting        X 2 min X 2 min  Black x 2 min   BOSU push ups          @ wall 2x10  At wall 2 x 20   Ther Activity                                       Gait Training                                       Modalities         2/1 2/22   CUS R distal biceps tendon  8 min, 1.5 w/cm sq RG EH EH EH RG RG RG RG AAW RG   IE/HEP RG

## 2024-07-23 ENCOUNTER — OFFICE VISIT (OUTPATIENT)
Dept: FAMILY MEDICINE CLINIC | Facility: CLINIC | Age: 49
End: 2024-07-23
Payer: COMMERCIAL

## 2024-07-23 ENCOUNTER — APPOINTMENT (OUTPATIENT)
Dept: RADIOLOGY | Facility: CLINIC | Age: 49
End: 2024-07-23
Payer: COMMERCIAL

## 2024-07-23 ENCOUNTER — TELEPHONE (OUTPATIENT)
Age: 49
End: 2024-07-23

## 2024-07-23 VITALS
DIASTOLIC BLOOD PRESSURE: 88 MMHG | OXYGEN SATURATION: 98 % | WEIGHT: 202 LBS | HEART RATE: 86 BPM | TEMPERATURE: 97.9 F | SYSTOLIC BLOOD PRESSURE: 138 MMHG | BODY MASS INDEX: 28.98 KG/M2

## 2024-07-23 DIAGNOSIS — S99.922A FOOT INJURY, LEFT, INITIAL ENCOUNTER: Primary | ICD-10-CM

## 2024-07-23 DIAGNOSIS — S99.922A FOOT INJURY, LEFT, INITIAL ENCOUNTER: ICD-10-CM

## 2024-07-23 PROCEDURE — 73630 X-RAY EXAM OF FOOT: CPT

## 2024-07-23 PROCEDURE — 99213 OFFICE O/P EST LOW 20 MIN: CPT | Performed by: NURSE PRACTITIONER

## 2024-07-23 NOTE — PROGRESS NOTES
Ambulatory Visit  Name: Oleg Dc      : 1975      MRN: [id number]  Encounter Provider: ROHITH Amin  Encounter Date: 2024   Encounter department: [facility]    Assessment & Plan   1. Foot injury, left, initial encounter  Assessment & Plan:  R/O fracture  Referral to podiatry  Conservative tx reviewed if no fracture  May continue Nsaid (denies need for rx); ice  Discussed pressure off load shoe - wait xray to determine   Orders:  -     XR foot 2 vw left; Future; Expected date: 2024  -     Ambulatory Referral to Podiatry; Future       History of Present Illness     Acute visit  Presents today with complaint of left foot pain/swelling.  Reports stubbing his foot on the leg of a coffee table  night while visiting a friend out of town.  It was dark, so he is unsure of exact details.  Noted immediate pain, swelling of his second and third toe.  No bruising/discoloration as of yet.  Does note some pain on the top of his foot, but primarily on the ball of his foot.  Denies any pain of his great toe.  Has been applying ice; OTC Advil with initial relief-pain returns once Advil starts to wear off.        Review of Systems   Musculoskeletal:         Left foot pain and swelling as in HPI       Objective     /88 (BP Location: Left arm, Patient Position: Sitting, Cuff Size: Standard)   Pulse 86   Temp 97.9 °F (36.6 °C) (Temporal)   Wt 91.6 kg (202 lb)   SpO2 98%   BMI 28.98 kg/m²     Physical Exam  Vitals and nursing note reviewed.   Constitutional:       General: He is not in acute distress.     Appearance: Normal appearance. He is not ill-appearing.   Musculoskeletal:      Left foot: Normal capillary refill. Swelling, tenderness and bony tenderness present. Normal pulse.      Comments: Swelling noted second/third toe  Pain in toes and about 1 inch up dorsal surface of foot  Skin intact with no bruising/discoloration  Tender to touch on the ball of his  foot   Skin:     General: Skin is warm and dry.   Neurological:      General: No focal deficit present.      Mental Status: He is alert and oriented to person, place, and time.   Psychiatric:         Attention and Perception: Attention normal.         Mood and Affect: Mood normal.         Behavior: Behavior normal.       Administrative Statements

## 2024-07-23 NOTE — ASSESSMENT & PLAN NOTE
R/O fracture  Referral to podiatry  Conservative tx reviewed if no fracture  May continue Nsaid (denies need for rx); ice  Discussed pressure off load shoe - wait xray to determine

## 2024-07-23 NOTE — TELEPHONE ENCOUNTER
Patient called to see if the results from his foot xray (taken today) were back.  I advised the doctor has not yet viewed the results.  He would like a call once reviewed so he knows how to proceed.  Thank you.

## 2024-07-24 DIAGNOSIS — T14.90XA SOFT TISSUE INJURY: ICD-10-CM

## 2024-07-24 DIAGNOSIS — M79.672 LEFT FOOT PAIN: Primary | ICD-10-CM

## 2024-07-24 RX ORDER — NAPROXEN 500 MG/1
500 TABLET ORAL 2 TIMES DAILY WITH MEALS
Qty: 60 TABLET | Refills: 0 | Status: SHIPPED | OUTPATIENT
Start: 2024-07-24

## 2024-07-24 NOTE — TELEPHONE ENCOUNTER
Spoke to patient, relayed your message. He is getting frustrated with the care he is getting. He would like a professional to call him to let him know what is injured,how long it takes to heal and what can be done.

## 2024-07-24 NOTE — TELEPHONE ENCOUNTER
Spoke with patient via phone.  Reviewed x-ray, and next steps.  He will try conservative treatment at this time: RICE.  Rx sent for naproxen twice daily.  Advise wrapping.  Continue elevating, ice.  May offload pressure for walking with cane.  Follow-up guidance given should symptoms change, persist and/or worsen.

## 2024-07-24 NOTE — TELEPHONE ENCOUNTER
Patient has been made aware of his X-Ray.  Stated he was contacted by Podiatry and he can not get in until Sept.    Patient would like to know what's wrong and the next step of care.    Please review  Thank you

## 2024-07-24 NOTE — TELEPHONE ENCOUNTER
Please call patient.  Following up with the specialist would be the next step.I advise he contact his insurance to see what other podiatrists are in network with his insurance.

## 2024-08-13 ENCOUNTER — OFFICE VISIT (OUTPATIENT)
Dept: FAMILY MEDICINE CLINIC | Facility: CLINIC | Age: 49
End: 2024-08-13
Payer: COMMERCIAL

## 2024-08-13 VITALS
OXYGEN SATURATION: 97 % | HEART RATE: 96 BPM | SYSTOLIC BLOOD PRESSURE: 138 MMHG | WEIGHT: 204 LBS | TEMPERATURE: 97.2 F | DIASTOLIC BLOOD PRESSURE: 96 MMHG | BODY MASS INDEX: 29.2 KG/M2 | HEIGHT: 70 IN

## 2024-08-13 DIAGNOSIS — M54.31 SCIATICA OF RIGHT SIDE: Primary | ICD-10-CM

## 2024-08-13 DIAGNOSIS — B36.0 TINEA VERSICOLOR: ICD-10-CM

## 2024-08-13 PROCEDURE — 99213 OFFICE O/P EST LOW 20 MIN: CPT | Performed by: FAMILY MEDICINE

## 2024-08-13 RX ORDER — METHYLPREDNISOLONE 4 MG
TABLET, DOSE PACK ORAL
Qty: 21 TABLET | Refills: 0 | Status: SHIPPED | OUTPATIENT
Start: 2024-08-13

## 2024-08-13 RX ORDER — KETOCONAZOLE 20 MG/ML
1 SHAMPOO TOPICAL 2 TIMES WEEKLY
Qty: 120 ML | Refills: 3 | Status: SHIPPED | OUTPATIENT
Start: 2024-08-15

## 2024-08-13 NOTE — PROGRESS NOTES
Ambulatory Visit  Name: Oleg Dc      : 1975      MRN: [id number]  Encounter Provider: Edvin Haynes DO  Encounter Date: 2024   Encounter department: [facility]    Assessment & Plan     Sciatica likely based on patient's description of symptoms.  Improving with home exercises but will add short course of steroids.  Patient to call if not improving.      1. Sciatica of right side  -     methylPREDNISolone 4 MG tablet therapy pack; Use as directed on package  2. Tinea versicolor  -     ketoconazole (NIZORAL) 2 % shampoo; Apply 1 Application topically 2 (two) times a week         History of Present Illness     Patient complains of right-sided leg pain for the past week with radiation from his buttock all the way down the back of his leg to his ankle with burning tingling pain.  Began after he injured his left foot and was limping heavily for more than a week prior to onset of symptoms      Review of Systems   Constitutional: Negative.    Respiratory: Negative.     Cardiovascular: Negative.    Gastrointestinal: Negative.    Genitourinary: Negative.    Musculoskeletal:  Positive for gait problem.        Right leg pain   Psychiatric/Behavioral: Negative.       Past Medical History:   Diagnosis Date   • Achrochordon     Resolved 3/25/2016    • Anxiety 2022   • Genital warts     Resolved 2014    • Neoplasm of uncertain behavior of skin     Resolved 3/25/2016    • Numbness of left anterior thigh     Resolved 2016      History reviewed. No pertinent surgical history.  Family History   Problem Relation Age of Onset   • No Known Problems Mother    • No Known Problems Father    • No Known Problems Brother      Social History     Tobacco Use   • Smoking status: Former     Current packs/day: 0.00     Average packs/day: 0.3 packs/day for 4.0 years (1.0 ttl pk-yrs)     Types: Cigarettes     Start date:      Quit date: 2018     Years since quittin.6   • Smokeless  "tobacco: Never   Vaping Use   • Vaping status: Some Days   • Start date: 5/10/2018   • Substances: Nicotine, THC   Substance and Sexual Activity   • Alcohol use: Yes     Alcohol/week: 1.0 standard drink of alcohol     Types: 1 Glasses of wine per week   • Drug use: No   • Sexual activity: Yes     Partners: Female     Current Outpatient Medications on File Prior to Visit   Medication Sig   • ALPRAZolam (XANAX) 0.5 mg tablet Take 1 tablet (0.5 mg total) by mouth once as needed for anxiety (before procedure) for up to 1 dose (Patient not taking: Reported on 3/17/2023)   • ibuprofen (MOTRIN) 100 mg/5 mL suspension Take by mouth every 6 (six) hours as needed for mild pain (Patient not taking: Reported on 11/13/2023)   • ibuprofen (MOTRIN) 200 mg tablet Take by mouth every 6 (six) hours as needed for mild pain (Patient not taking: Reported on 2/8/2024)   • naproxen (Naprosyn) 500 mg tablet Take 1 tablet (500 mg total) by mouth 2 (two) times a day with meals (Patient not taking: Reported on 8/13/2024)   • NON FORMULARY Medical Marijuana- Vapes (Patient not taking: Reported on 8/30/2023)   • [DISCONTINUED] methylPREDNISolone 4 MG tablet therapy pack Use as directed on package (Patient not taking: Reported on 7/23/2024)     Allergies   Allergen Reactions   • Penicillins      Immunization History   Administered Date(s) Administered   • COVID-19 PFIZER VACCINE 0.3 ML IM 04/27/2021, 05/18/2021   • Tdap 02/27/2012   • Tuberculin Skin Test-PPD Intradermal 06/26/2013, 08/20/2014     Objective     /96   Pulse 96   Temp (!) 97.2 °F (36.2 °C)   Ht 5' 9.69\" (1.77 m)   Wt 92.5 kg (204 lb)   SpO2 97%   BMI 29.54 kg/m²     Physical Exam  Musculoskeletal:      Comments: Negative straight leg raising.  Normal reflexes.  Normal gait         "

## 2024-09-20 ENCOUNTER — OFFICE VISIT (OUTPATIENT)
Dept: FAMILY MEDICINE CLINIC | Facility: CLINIC | Age: 49
End: 2024-09-20
Payer: COMMERCIAL

## 2024-09-20 VITALS
TEMPERATURE: 98 F | DIASTOLIC BLOOD PRESSURE: 106 MMHG | BODY MASS INDEX: 29.2 KG/M2 | HEIGHT: 70 IN | SYSTOLIC BLOOD PRESSURE: 160 MMHG | WEIGHT: 204 LBS | HEART RATE: 85 BPM | OXYGEN SATURATION: 98 %

## 2024-09-20 DIAGNOSIS — Z13.6 SCREENING FOR CARDIOVASCULAR CONDITION: ICD-10-CM

## 2024-09-20 DIAGNOSIS — M79.672 FOOT PAIN, LEFT: ICD-10-CM

## 2024-09-20 DIAGNOSIS — M25.50 ARTHRALGIA, UNSPECIFIED JOINT: Primary | ICD-10-CM

## 2024-09-20 DIAGNOSIS — Z13.0 SCREENING FOR DEFICIENCY ANEMIA: ICD-10-CM

## 2024-09-20 DIAGNOSIS — M79.604 LEG PAIN, ANTERIOR, RIGHT: ICD-10-CM

## 2024-09-20 DIAGNOSIS — Z13.1 SCREENING FOR DIABETES MELLITUS: ICD-10-CM

## 2024-09-20 DIAGNOSIS — Z13.29 SCREENING FOR THYROID DISORDER: ICD-10-CM

## 2024-09-20 PROCEDURE — 99213 OFFICE O/P EST LOW 20 MIN: CPT | Performed by: FAMILY MEDICINE

## 2024-09-20 NOTE — PROGRESS NOTES
Ambulatory Visit  Name: Oleg Dc      : 1975      MRN: [id number]  Encounter Provider: Edvin Haynes DO  Encounter Date: 2024   Encounter department: [facility]    Assessment & Plan  Arthralgia, unspecified joint  Patient with multiple symptoms involving his left foot, left thigh and right lower extremity.  These may or may not be connected with each other.  Will check x-rays of the lumbar spine, left foot and check labs to rule out connective tissue disorders.  Will also have him restart naproxen 500 mg twice daily.  If the foot continues to be painful and does not respond to treatment would consider referral to podiatry.      Orders:    BEATRIZ Screen w/ Reflex to Titer/Pattern    C-reactive protein    RF Screen w/ Reflex to Titer    Uric acid    Lyme Total AB W Reflex to IGM/IGG; Future    Screening for cardiovascular condition    Orders:    Lipid Panel with Direct LDL reflex; Future    Screening for deficiency anemia    Orders:    CBC and differential; Future    Screening for diabetes mellitus    Orders:    Comprehensive metabolic panel; Future    Screening for thyroid disorder    Orders:    TSH, 3rd generation with Free T4 reflex; Future    Foot pain, left    Orders:    XR foot 3+ vw left; Future    Leg pain, anterior, right    Orders:    XR spine lumbar minimum 4 views non injury; Future         History of Present Illness     Patient presents with several physical complaints.  He complains of left foot pain which began suddenly in the last 24 to 48 hours.  Pain is located in the ball of his foot at the base of his third toe.  He did not have any trauma to that foot recently though he did have an injury to his foot at the end of July for which she was seen and had x-rays done.  The symptoms are in the same place as his pain was at that time.  Since July his pain had completely resolved until just the last 2 days.    He also complains of a patch of numbness on the left  thigh which has gradually enlarged over the last several weeks.      He was also seen in mid August for right-sided sciatica and hip pain for which she was treated with short course of steroids.  That is mostly resolved though he still has some residual symptoms.        Review of Systems   Constitutional: Negative.    Respiratory: Negative.     Cardiovascular: Negative.    Gastrointestinal: Negative.    Genitourinary: Negative.    Musculoskeletal:  Positive for arthralgias and back pain.   Neurological:  Positive for numbness.   Psychiatric/Behavioral: Negative.       Past Medical History:   Diagnosis Date    Achrochordon     Resolved 3/25/2016     Anxiety 2022    Genital warts     Resolved 2014     Neoplasm of uncertain behavior of skin     Resolved 3/25/2016     Numbness of left anterior thigh     Resolved 2016      History reviewed. No pertinent surgical history.  Family History   Problem Relation Age of Onset    No Known Problems Mother     No Known Problems Father     No Known Problems Brother      Social History     Tobacco Use    Smoking status: Former     Current packs/day: 0.00     Average packs/day: 0.3 packs/day for 4.0 years (1.0 ttl pk-yrs)     Types: Cigarettes     Start date:      Quit date: 2018     Years since quittin.7    Smokeless tobacco: Never   Vaping Use    Vaping status: Some Days    Start date: 5/10/2018    Substances: Nicotine, THC   Substance and Sexual Activity    Alcohol use: Yes     Alcohol/week: 1.0 standard drink of alcohol     Types: 1 Glasses of wine per week    Drug use: No    Sexual activity: Yes     Partners: Female     Current Outpatient Medications on File Prior to Visit   Medication Sig    ALPRAZolam (XANAX) 0.5 mg tablet Take 1 tablet (0.5 mg total) by mouth once as needed for anxiety (before procedure) for up to 1 dose (Patient not taking: Reported on 3/17/2023)    ibuprofen (MOTRIN) 100 mg/5 mL suspension Take by mouth every 6 (six) hours as needed  "for mild pain (Patient not taking: Reported on 11/13/2023)    ibuprofen (MOTRIN) 200 mg tablet Take by mouth every 6 (six) hours as needed for mild pain (Patient not taking: Reported on 2/8/2024)    ketoconazole (NIZORAL) 2 % shampoo Apply 1 Application topically 2 (two) times a week (Patient not taking: Reported on 9/20/2024)    methylPREDNISolone 4 MG tablet therapy pack Use as directed on package (Patient not taking: Reported on 9/20/2024)    naproxen (Naprosyn) 500 mg tablet Take 1 tablet (500 mg total) by mouth 2 (two) times a day with meals (Patient not taking: Reported on 8/13/2024)    NON FORMULARY Medical Marijuana- Vapes (Patient not taking: Reported on 8/30/2023)     Allergies   Allergen Reactions    Penicillins      Immunization History   Administered Date(s) Administered    COVID-19 PFIZER VACCINE 0.3 ML IM 04/27/2021, 05/18/2021    Tdap 02/27/2012    Tuberculin Skin Test-PPD Intradermal 06/26/2013, 08/20/2014     Objective     BP (!) 160/106 (BP Location: Left arm, Patient Position: Sitting, Cuff Size: Large)   Pulse 85   Temp 98 °F (36.7 °C) (Temporal)   Ht 5' 9.69\" (1.77 m)   Wt 92.5 kg (204 lb)   SpO2 98%   BMI 29.53 kg/m²     Physical Exam  Vitals and nursing note reviewed.   Constitutional:       General: He is not in acute distress.     Appearance: Normal appearance.   HENT:      Head: Normocephalic and atraumatic.   Eyes:      Pupils: Pupils are equal, round, and reactive to light.   Cardiovascular:      Rate and Rhythm: Normal rate and regular rhythm.      Pulses: Normal pulses.      Heart sounds: Normal heart sounds.   Pulmonary:      Effort: Pulmonary effort is normal.      Breath sounds: Normal breath sounds.   Musculoskeletal:         General: Normal range of motion.      Cervical back: Normal range of motion.   Skin:     General: Skin is warm and dry.   Neurological:      General: No focal deficit present.      Mental Status: He is alert and oriented to person, place, and time. " Mental status is at baseline.   Psychiatric:         Mood and Affect: Mood normal.         Behavior: Behavior normal.         Thought Content: Thought content normal.         Judgment: Judgment normal.

## 2024-09-21 ENCOUNTER — APPOINTMENT (OUTPATIENT)
Dept: RADIOLOGY | Facility: CLINIC | Age: 49
End: 2024-09-21
Payer: COMMERCIAL

## 2024-09-21 DIAGNOSIS — M79.604 LEG PAIN, ANTERIOR, RIGHT: ICD-10-CM

## 2024-09-21 DIAGNOSIS — M79.672 FOOT PAIN, LEFT: ICD-10-CM

## 2024-09-21 PROCEDURE — 72110 X-RAY EXAM L-2 SPINE 4/>VWS: CPT

## 2024-09-21 PROCEDURE — 73630 X-RAY EXAM OF FOOT: CPT

## 2024-09-30 ENCOUNTER — APPOINTMENT (OUTPATIENT)
Dept: LAB | Facility: CLINIC | Age: 49
End: 2024-09-30
Payer: COMMERCIAL

## 2024-09-30 DIAGNOSIS — Z13.6 SCREENING FOR CARDIOVASCULAR CONDITION: ICD-10-CM

## 2024-09-30 DIAGNOSIS — Z13.1 SCREENING FOR DIABETES MELLITUS: ICD-10-CM

## 2024-09-30 DIAGNOSIS — Z13.0 SCREENING FOR DEFICIENCY ANEMIA: ICD-10-CM

## 2024-09-30 DIAGNOSIS — Z13.29 SCREENING FOR THYROID DISORDER: ICD-10-CM

## 2024-09-30 DIAGNOSIS — M25.50 ARTHRALGIA, UNSPECIFIED JOINT: ICD-10-CM

## 2024-09-30 LAB
ALBUMIN SERPL BCG-MCNC: 4.9 G/DL (ref 3.5–5)
ALP SERPL-CCNC: 60 U/L (ref 34–104)
ALT SERPL W P-5'-P-CCNC: 32 U/L (ref 7–52)
ANA SER QL IA: NEGATIVE
ANION GAP SERPL CALCULATED.3IONS-SCNC: 10 MMOL/L (ref 4–13)
AST SERPL W P-5'-P-CCNC: 19 U/L (ref 13–39)
B BURGDOR IGG SERPL QL IA: NEGATIVE
B BURGDOR IGG+IGM SER QL IA: POSITIVE
B BURGDOR IGM SERPL QL IA: NEGATIVE
BASOPHILS # BLD AUTO: 0.03 THOUSANDS/ÂΜL (ref 0–0.1)
BASOPHILS NFR BLD AUTO: 0 % (ref 0–1)
BILIRUB SERPL-MCNC: 0.75 MG/DL (ref 0.2–1)
BUN SERPL-MCNC: 17 MG/DL (ref 5–25)
CALCIUM SERPL-MCNC: 9.9 MG/DL (ref 8.4–10.2)
CHLORIDE SERPL-SCNC: 102 MMOL/L (ref 96–108)
CHOLEST SERPL-MCNC: 238 MG/DL
CO2 SERPL-SCNC: 28 MMOL/L (ref 21–32)
CREAT SERPL-MCNC: 1 MG/DL (ref 0.6–1.3)
CRP SERPL QL: 1.3 MG/L
EOSINOPHIL # BLD AUTO: 0.1 THOUSAND/ÂΜL (ref 0–0.61)
EOSINOPHIL NFR BLD AUTO: 1 % (ref 0–6)
ERYTHROCYTE [DISTWIDTH] IN BLOOD BY AUTOMATED COUNT: 12.1 % (ref 11.6–15.1)
GFR SERPL CREATININE-BSD FRML MDRD: 89 ML/MIN/1.73SQ M
GLUCOSE P FAST SERPL-MCNC: 97 MG/DL (ref 65–99)
HCT VFR BLD AUTO: 54.6 % (ref 36.5–49.3)
HDLC SERPL-MCNC: 62 MG/DL
HGB BLD-MCNC: 18.1 G/DL (ref 12–17)
IMM GRANULOCYTES # BLD AUTO: 0.04 THOUSAND/UL (ref 0–0.2)
IMM GRANULOCYTES NFR BLD AUTO: 0 % (ref 0–2)
LDLC SERPL CALC-MCNC: 133 MG/DL (ref 0–100)
LYMPHOCYTES # BLD AUTO: 1.6 THOUSANDS/ÂΜL (ref 0.6–4.47)
LYMPHOCYTES NFR BLD AUTO: 17 % (ref 14–44)
MCH RBC QN AUTO: 30.8 PG (ref 26.8–34.3)
MCHC RBC AUTO-ENTMCNC: 33.2 G/DL (ref 31.4–37.4)
MCV RBC AUTO: 93 FL (ref 82–98)
MONOCYTES # BLD AUTO: 0.62 THOUSAND/ÂΜL (ref 0.17–1.22)
MONOCYTES NFR BLD AUTO: 7 % (ref 4–12)
NEUTROPHILS # BLD AUTO: 6.81 THOUSANDS/ÂΜL (ref 1.85–7.62)
NEUTS SEG NFR BLD AUTO: 75 % (ref 43–75)
NRBC BLD AUTO-RTO: 0 /100 WBCS
PLATELET # BLD AUTO: 190 THOUSANDS/UL (ref 149–390)
PMV BLD AUTO: 11.2 FL (ref 8.9–12.7)
POTASSIUM SERPL-SCNC: 4.1 MMOL/L (ref 3.5–5.3)
PROT SERPL-MCNC: 7.4 G/DL (ref 6.4–8.4)
RBC # BLD AUTO: 5.88 MILLION/UL (ref 3.88–5.62)
SODIUM SERPL-SCNC: 140 MMOL/L (ref 135–147)
TRIGL SERPL-MCNC: 217 MG/DL
TSH SERPL DL<=0.05 MIU/L-ACNC: 2.12 UIU/ML (ref 0.45–4.5)
URATE SERPL-MCNC: 7.5 MG/DL (ref 3.5–8.5)
WBC # BLD AUTO: 9.2 THOUSAND/UL (ref 4.31–10.16)

## 2024-09-30 PROCEDURE — 85025 COMPLETE CBC W/AUTO DIFF WBC: CPT

## 2024-09-30 PROCEDURE — 80053 COMPREHEN METABOLIC PANEL: CPT

## 2024-09-30 PROCEDURE — 80061 LIPID PANEL: CPT

## 2024-09-30 PROCEDURE — 84443 ASSAY THYROID STIM HORMONE: CPT

## 2024-09-30 PROCEDURE — 36415 COLL VENOUS BLD VENIPUNCTURE: CPT

## 2024-09-30 PROCEDURE — 86618 LYME DISEASE ANTIBODY: CPT

## 2024-09-30 PROCEDURE — 86617 LYME DISEASE ANTIBODY: CPT

## 2024-10-01 LAB — RHEUMATOID FACT SER QL LA: NEGATIVE

## 2024-10-09 ENCOUNTER — OFFICE VISIT (OUTPATIENT)
Dept: FAMILY MEDICINE CLINIC | Facility: CLINIC | Age: 49
End: 2024-10-09
Payer: COMMERCIAL

## 2024-10-09 VITALS
SYSTOLIC BLOOD PRESSURE: 130 MMHG | TEMPERATURE: 97.8 F | OXYGEN SATURATION: 99 % | HEART RATE: 80 BPM | WEIGHT: 208 LBS | BODY MASS INDEX: 29.78 KG/M2 | HEIGHT: 70 IN | DIASTOLIC BLOOD PRESSURE: 90 MMHG

## 2024-10-09 DIAGNOSIS — M79.672 LEFT FOOT PAIN: Primary | ICD-10-CM

## 2024-10-09 PROCEDURE — 99213 OFFICE O/P EST LOW 20 MIN: CPT | Performed by: FAMILY MEDICINE

## 2024-10-09 RX ORDER — MELOXICAM 7.5 MG/1
7.5 TABLET ORAL DAILY PRN
Qty: 30 TABLET | Refills: 0 | Status: SHIPPED | OUTPATIENT
Start: 2024-10-09

## 2024-10-09 NOTE — PROGRESS NOTES
Ambulatory Visit  Name: Oleg Dc      : 1975      MRN: [id number]  Encounter Provider: Edvin Haynes DO  Encounter Date: 10/9/2024   Encounter department: [facility]    Assessment & Plan  Left foot pain  Etiology of foot pain unclear.  2 x-rays have been normal.  Will check MRI of the foot to rule out occult/stress fracture.  Will also refer to podiatry. It is likely that his right leg pain is resulting from an altered gait as he compensates for his left foot pain.  Left thigh numbness I believe is a separate issue which predates all of these other symptoms and likely represents meralgia paresthetica.  Will treat with meloxicam.      Orders:    MRI foot/forefoot toes left wo contrast; Future    Ambulatory Referral to Podiatry; Future    meloxicam (MOBIC) 7.5 mg tablet; Take 1 tablet (7.5 mg total) by mouth daily as needed for moderate pain         History of Present Illness     Patient returns with ongoing pain involving his left foot.  He also has right leg pain and left thigh numbness.  The thigh numbness has been present over 1 year.  The left foot pain has been present since July when he had an incident where he accidentally kicked his foot into a coffee table.  X-rays done then and repeated recently were negative.  As a result of the ongoing pain in his left foot he has been limping and altering his gait and as a result has developed right leg pain.  Recent lab work for rheumatoid issues was done and was essentially negative with exception of screen for Lyme disease which was positive.  Confirmatory Lyme testing was negative      Review of Systems   Constitutional: Negative.    Respiratory: Negative.     Cardiovascular: Negative.    Gastrointestinal: Negative.    Genitourinary: Negative.    Musculoskeletal:  Positive for arthralgias.   Psychiatric/Behavioral: Negative.       Past Medical History:   Diagnosis Date    Achrochordon     Resolved 3/25/2016     Anxiety  2022    Genital warts     Resolved 2014     Neoplasm of uncertain behavior of skin     Resolved 3/25/2016     Numbness of left anterior thigh     Resolved 2016      History reviewed. No pertinent surgical history.  Family History   Problem Relation Age of Onset    No Known Problems Mother     No Known Problems Father     No Known Problems Brother      Social History     Tobacco Use    Smoking status: Former     Current packs/day: 0.00     Average packs/day: 0.3 packs/day for 4.0 years (1.0 ttl pk-yrs)     Types: Cigarettes     Start date:      Quit date:      Years since quittin.7    Smokeless tobacco: Never   Vaping Use    Vaping status: Some Days    Start date: 5/10/2018    Substances: Nicotine, THC   Substance and Sexual Activity    Alcohol use: Yes     Alcohol/week: 1.0 standard drink of alcohol     Types: 1 Glasses of wine per week    Drug use: No    Sexual activity: Yes     Partners: Female     Current Outpatient Medications on File Prior to Visit   Medication Sig    ALPRAZolam (XANAX) 0.5 mg tablet Take 1 tablet (0.5 mg total) by mouth once as needed for anxiety (before procedure) for up to 1 dose (Patient not taking: Reported on 3/17/2023)    ibuprofen (MOTRIN) 100 mg/5 mL suspension Take by mouth every 6 (six) hours as needed for mild pain (Patient not taking: Reported on 2023)    ibuprofen (MOTRIN) 200 mg tablet Take by mouth every 6 (six) hours as needed for mild pain (Patient not taking: Reported on 2024)    ketoconazole (NIZORAL) 2 % shampoo Apply 1 Application topically 2 (two) times a week (Patient not taking: Reported on 2024)    methylPREDNISolone 4 MG tablet therapy pack Use as directed on package (Patient not taking: Reported on 2024)    naproxen (Naprosyn) 500 mg tablet Take 1 tablet (500 mg total) by mouth 2 (two) times a day with meals (Patient not taking: Reported on 2024)    NON FORMULARY Medical Marijuana- Vapes (Patient not taking:  "Reported on 8/30/2023)     Allergies   Allergen Reactions    Penicillins      Immunization History   Administered Date(s) Administered    COVID-19 PFIZER VACCINE 0.3 ML IM 04/27/2021, 05/18/2021    Tdap 02/27/2012    Tuberculin Skin Test-PPD Intradermal 06/26/2013, 08/20/2014     Objective     /90 (BP Location: Left arm, Patient Position: Sitting, Cuff Size: Large)   Pulse 80   Temp 97.8 °F (36.6 °C) (Temporal)   Ht 5' 9.69\" (1.77 m)   Wt 94.3 kg (208 lb)   SpO2 99%   BMI 30.11 kg/m²     Physical Exam  Vitals and nursing note reviewed.   Constitutional:       General: He is not in acute distress.     Appearance: Normal appearance. He is well-developed. He is not diaphoretic.   HENT:      Head: Normocephalic and atraumatic.   Eyes:      General:         Right eye: No discharge.      Conjunctiva/sclera: Conjunctivae normal.      Pupils: Pupils are equal, round, and reactive to light.   Neck:      Thyroid: No thyromegaly.   Cardiovascular:      Rate and Rhythm: Normal rate and regular rhythm.   Pulmonary:      Effort: Pulmonary effort is normal. No respiratory distress.      Breath sounds: Normal breath sounds.   Musculoskeletal:      Cervical back: Normal range of motion.   Lymphadenopathy:      Cervical: No cervical adenopathy.   Skin:     General: Skin is warm and dry.   Neurological:      Mental Status: He is alert and oriented to person, place, and time.   Psychiatric:         Mood and Affect: Mood normal.         Behavior: Behavior normal.         Thought Content: Thought content normal.         Judgment: Judgment normal.         "

## 2024-10-10 ENCOUNTER — TELEPHONE (OUTPATIENT)
Age: 49
End: 2024-10-10

## 2024-10-10 NOTE — TELEPHONE ENCOUNTER
Caller: Oleg Dc    Doctor and/or Office: Dr. Bajwa/[facility]/[place] or [place]    #: [phone]    Escalation: Appointment/Asking to be seen sooner than 11/27--had foot injury in July/was better/2 weeks ago it started hurting again so saw Dr. Haynes and he said to see Dr. Bajwa. Xrays were negative but he is in so much pain is asking to be seen sooner at either office with Dr. Bajwa. Referral in [facility] is routine. I did schedule for November, but he said that is unacceptable. Please call back and advise. Thanks

## 2024-10-10 NOTE — TELEPHONE ENCOUNTER
Caller: Oleg Dc    Doctor: Dr. Bajwa/NP    Reason for call: we LM that we can see him sooner/transferred call to office so he can be scheduled    Call back#: NA

## 2024-10-18 ENCOUNTER — HOSPITAL ENCOUNTER (OUTPATIENT)
Dept: MRI IMAGING | Facility: HOSPITAL | Age: 49
End: 2024-10-18
Payer: COMMERCIAL

## 2024-10-18 DIAGNOSIS — M79.672 LEFT FOOT PAIN: ICD-10-CM

## 2024-10-18 PROCEDURE — 73718 MRI LOWER EXTREMITY W/O DYE: CPT

## 2024-10-23 ENCOUNTER — OFFICE VISIT (OUTPATIENT)
Dept: PODIATRY | Facility: CLINIC | Age: 49
End: 2024-10-23
Payer: COMMERCIAL

## 2024-10-23 VITALS
DIASTOLIC BLOOD PRESSURE: 89 MMHG | BODY MASS INDEX: 30.36 KG/M2 | SYSTOLIC BLOOD PRESSURE: 121 MMHG | HEART RATE: 97 BPM | RESPIRATION RATE: 18 BRPM | HEIGHT: 69 IN | WEIGHT: 205 LBS

## 2024-10-23 DIAGNOSIS — G62.9 PERIPHERAL NERVE DISORDER: ICD-10-CM

## 2024-10-23 DIAGNOSIS — M79.672 LEFT FOOT PAIN: ICD-10-CM

## 2024-10-23 DIAGNOSIS — S99.922A INJURY OF PLANTAR PLATE OF LEFT FOOT, INITIAL ENCOUNTER: Primary | ICD-10-CM

## 2024-10-23 PROCEDURE — 20600 DRAIN/INJ JOINT/BURSA W/O US: CPT | Performed by: PODIATRIST

## 2024-10-23 PROCEDURE — 99214 OFFICE O/P EST MOD 30 MIN: CPT | Performed by: PODIATRIST

## 2024-10-23 RX ORDER — GABAPENTIN 300 MG/1
300 CAPSULE ORAL
Qty: 30 CAPSULE | Refills: 0 | Status: SHIPPED | OUTPATIENT
Start: 2024-10-23 | End: 2024-11-22

## 2024-10-23 RX ORDER — TRIAMCINOLONE ACETONIDE 40 MG/ML
20 INJECTION, SUSPENSION INTRA-ARTICULAR; INTRAMUSCULAR
Status: SHIPPED | OUTPATIENT
Start: 2024-10-23

## 2024-10-23 RX ORDER — LIDOCAINE HYDROCHLORIDE 10 MG/ML
1 INJECTION, SOLUTION INFILTRATION; PERINEURAL
Status: SHIPPED | OUTPATIENT
Start: 2024-10-23

## 2024-10-23 RX ADMIN — LIDOCAINE HYDROCHLORIDE 1 ML: 10 INJECTION, SOLUTION INFILTRATION; PERINEURAL at 08:00

## 2024-10-23 RX ADMIN — TRIAMCINOLONE ACETONIDE 20 MG: 40 INJECTION, SUSPENSION INTRA-ARTICULAR; INTRAMUSCULAR at 08:00

## 2024-10-23 NOTE — PROGRESS NOTES
Ambulatory Visit  Name: Oleg Dc      : 1975      MRN: [id number]  Encounter Provider: Calin Bajwa DPM  Encounter Date: 10/23/2024   Encounter department: [facility] PODIAT[facility] [place]    Explained to patient that his left foot symptoms are most consistent with a plantar plate injury.  Treatment consisted of injecting the second MPJ of the left foot with 0.5 cc Kenalog 40 along with 1 cc 1% Xylocaine.    Patient also appears to be dealing with symptoms consistent with peripheral neuropathy primarily right leg.  He was placed on gabapentin 300 mg at bedtime.  Reappoint 4-6 weeks.    Assessment & Plan  Left foot pain    Orders:    Ambulatory Referral to Podiatry    Injury of plantar plate of left foot, initial encounter         Peripheral nerve disorder    Orders:    gabapentin (Neurontin) 300 mg capsule; Take 1 capsule (300 mg total) by mouth daily at bedtime      History of Present Illness     Oleg Dc is a 49 y.o. male who presents with multiple pedal issues.  In 2024, patient was barefoot and accidentally stubbed his left foot against a coffee table.  He had significant pain the next day after the injury stating that his pain was severe.  X-rays were taken and is read as negative.  After a few weeks from the injury, the foot started to feel better.  Patient began extended walks and again the foot flared.  Because of this he had an MRI and it was read as negative for significant pathology.    The patient also notes tingling and burning in his right lower extremity.  This burning and tingling extends as far as his right foot.  He states an inability to sleep and his bed with his wife due to the discomfort when laying down.  He was told that this right lower extremity pain was likely compensatory to a change in gait due to the left foot pain.    On questioning, the patient denies significant edema or bruising following his original injury or any time over the past few months.    I  "personally reviewed x-rays taken of the left foot dated 7/23/2024.  They were negative for osseous pathology.    I personally reviewed x-rays of the left foot dated 9/21/2024.  They were negative for osseous pathology.    I personally reviewed an MRI dated 10/18/2024.  They were negative for left foot pathology.      Review of Systems   Musculoskeletal:  Positive for gait problem.   Neurological:         Radiculopathy   Psychiatric/Behavioral: Negative.                 Objective     /89   Pulse 97   Resp 18   Ht 5' 9\" (1.753 m)   Wt 93 kg (205 lb)   BMI 30.27 kg/m²     Physical Exam  Constitutional:       Appearance: Normal appearance.   Cardiovascular:      Pulses: Normal pulses.   Musculoskeletal:         General: Tenderness present.      Comments: Sharp pain with palpation plantar plate left second MPJ.  This pain is not present if the second toe was plantarflexed and pressure was provided.  No evidence of hammertoe deformity.   Skin:     General: Skin is warm.   Neurological:      General: No focal deficit present.      Mental Status: He is oriented to person, place, and time.      Sensory: Sensory deficit present.      Comments: Numbness related right foot.       Small joint arthrocentesis: L second MTP  Universal Protocol:  procedure performed by consultantConsent: Verbal consent obtained.  Risks and benefits: risks, benefits and alternatives were discussed  Consent given by: patient  Patient understanding: patient states understanding of the procedure being performed  Patient identity confirmed: verbally with patient  Supporting Documentation  Indications: pain   Procedure Details  Location: second toe - L second MTP  Needle size: 25 G  Approach: dorsal  Medications administered: 1 mL lidocaine 1 %; 20 mg triamcinolone acetonide 40 mg/mL                "

## 2024-11-05 ENCOUNTER — OFFICE VISIT (OUTPATIENT)
Dept: FAMILY MEDICINE CLINIC | Facility: CLINIC | Age: 49
End: 2024-11-05
Payer: COMMERCIAL

## 2024-11-05 VITALS
HEIGHT: 69 IN | WEIGHT: 204 LBS | SYSTOLIC BLOOD PRESSURE: 130 MMHG | BODY MASS INDEX: 30.21 KG/M2 | OXYGEN SATURATION: 99 % | HEART RATE: 83 BPM | DIASTOLIC BLOOD PRESSURE: 90 MMHG | TEMPERATURE: 98.2 F

## 2024-11-05 DIAGNOSIS — A63.0 CONDYLOMA: Primary | ICD-10-CM

## 2024-11-05 DIAGNOSIS — M79.672 LEFT FOOT PAIN: ICD-10-CM

## 2024-11-05 PROCEDURE — 17110 DESTRUCTION B9 LES UP TO 14: CPT | Performed by: FAMILY MEDICINE

## 2024-11-05 RX ORDER — MELOXICAM 7.5 MG/1
7.5 TABLET ORAL DAILY PRN
Qty: 30 TABLET | Refills: 0 | Status: SHIPPED | OUTPATIENT
Start: 2024-11-05

## 2024-11-05 NOTE — PROGRESS NOTES
Ambulatory Visit  Name: Oleg Dc      : 1975      MRN: [id number]  Encounter Provider: Edvin Haynes DO  Encounter Date: 2024   Encounter department: [facility]    Assessment & Plan  Condyloma  Cryotherapy           Left foot pain    Orders:    meloxicam (MOBIC) 7.5 mg tablet; Take 1 tablet (7.5 mg total) by mouth daily as needed for moderate pain           Lesion Destruction    Date/Time: 2024 8:00 AM    Performed by: Edvin Haynes DO  Authorized by: Edvin Haynes DO  Universal Protocol:  procedure performed by consultantConsent: Verbal consent obtained.  Risks and benefits: risks, benefits and alternatives were discussed  Consent given by: patient  Patient understanding: patient states understanding of the procedure being performed  Required items: required blood products, implants, devices, and special equipment available  Patient identity confirmed: verbally with patient    Procedure Details - Lesion Destruction:     Number of Lesions:  3  Lesion 1:     Body area:  Anogenital    Anogenital location:  Penis    Initial size (mm):  1    Final defect size (mm):  1    Malignancy: benign lesion      Destruction method: cryotherapy      Extensive destruction required?: No    Lesion 2:     Body area:  Anogenital    Anogenital location:  Penis    Initial size (mm):  1    Final defect size (mm):  1    Malignancy: benign lesion      Destruction method: cryotherapy      Extensive destruction required?: No    Lesion 3:     Body area:  Anogenital    Anogenital location:  Penis    Initial size (mm):  2    Final defect size (mm):  2    Malignancy: benign lesion      Destruction method: cryotherapy      Extensive destruction required?: No       3 lesions on shaft of penis treated with cryotherapy without incident.      History of Present Illness     Recurrence of genital warts      Review of Systems  Past Medical History:   Diagnosis Date    Achrbrettordon     Resolved  3/25/2016     Anxiety 2022    Genital warts     Resolved 2014     Neoplasm of uncertain behavior of skin     Resolved 3/25/2016     Numbness of left anterior thigh     Resolved 2016      No past surgical history on file.  Family History   Problem Relation Age of Onset    No Known Problems Mother     No Known Problems Father     No Known Problems Brother      Social History     Tobacco Use    Smoking status: Former     Current packs/day: 0.00     Average packs/day: 0.3 packs/day for 4.0 years (1.0 ttl pk-yrs)     Types: Cigarettes     Start date:      Quit date:      Years since quittin.8    Smokeless tobacco: Never   Vaping Use    Vaping status: Some Days    Start date: 5/10/2018    Substances: Nicotine, THC   Substance and Sexual Activity    Alcohol use: Yes     Alcohol/week: 1.0 standard drink of alcohol     Types: 1 Glasses of wine per week    Drug use: No    Sexual activity: Yes     Partners: Female     Current Outpatient Medications on File Prior to Visit   Medication Sig    ALPRAZolam (XANAX) 0.5 mg tablet Take 1 tablet (0.5 mg total) by mouth once as needed for anxiety (before procedure) for up to 1 dose (Patient not taking: Reported on 3/17/2023)    gabapentin (Neurontin) 300 mg capsule Take 1 capsule (300 mg total) by mouth daily at bedtime (Patient not taking: Reported on 2024)    ibuprofen (MOTRIN) 100 mg/5 mL suspension Take by mouth every 6 (six) hours as needed for mild pain (Patient not taking: Reported on 2023)    ibuprofen (MOTRIN) 200 mg tablet Take by mouth every 6 (six) hours as needed for mild pain (Patient not taking: Reported on 2024)    ketoconazole (NIZORAL) 2 % shampoo Apply 1 Application topically 2 (two) times a week (Patient not taking: Reported on 2024)    methylPREDNISolone 4 MG tablet therapy pack Use as directed on package (Patient not taking: Reported on 2024)    naproxen (Naprosyn) 500 mg tablet Take 1 tablet (500 mg total) by  "mouth 2 (two) times a day with meals (Patient not taking: Reported on 8/13/2024)    NON FORMULARY Medical Marijuana- Vapes (Patient not taking: Reported on 8/30/2023)    [DISCONTINUED] meloxicam (MOBIC) 7.5 mg tablet Take 1 tablet (7.5 mg total) by mouth daily as needed for moderate pain (Patient not taking: Reported on 11/5/2024)     Allergies   Allergen Reactions    Penicillins      Immunization History   Administered Date(s) Administered    COVID-19 PFIZER VACCINE 0.3 ML IM 04/27/2021, 05/18/2021    Tdap 02/27/2012    Tuberculin Skin Test-PPD Intradermal 06/26/2013, 08/20/2014     Objective     /90 (BP Location: Left arm, Patient Position: Sitting, Cuff Size: Large)   Pulse 83   Temp 98.2 °F (36.8 °C) (Temporal)   Ht 5' 9\" (1.753 m)   Wt 92.5 kg (204 lb)   SpO2 99%   BMI 30.13 kg/m²     Physical Exam  Skin:     Findings: Lesion (3 small, 1 to 2 mm lesions consistent with condyloma) present.         "

## 2024-12-04 ENCOUNTER — OFFICE VISIT (OUTPATIENT)
Dept: PODIATRY | Facility: CLINIC | Age: 49
End: 2024-12-04
Payer: COMMERCIAL

## 2024-12-04 VITALS — WEIGHT: 203 LBS | BODY MASS INDEX: 30.07 KG/M2 | RESPIRATION RATE: 18 BRPM | HEIGHT: 69 IN

## 2024-12-04 DIAGNOSIS — G62.9 PERIPHERAL NERVE DISORDER: ICD-10-CM

## 2024-12-04 DIAGNOSIS — S99.922A INJURY OF PLANTAR PLATE OF LEFT FOOT, INITIAL ENCOUNTER: Primary | ICD-10-CM

## 2024-12-04 DIAGNOSIS — M25.551 PAIN OF RIGHT HIP: ICD-10-CM

## 2024-12-04 PROCEDURE — 99213 OFFICE O/P EST LOW 20 MIN: CPT | Performed by: PODIATRIST

## 2024-12-04 NOTE — PROGRESS NOTES
Patient present for assessment.  At last visit, patient was diagnosed with a plantar plate injury at the second MPJ left foot along with a peripheral nerve disorder affecting the right foot.    Cortisone injection was given at the second MPJ left foot and patient was placed on gabapentin 300 mg at bedtime.    Presently, no left foot pain is reported.  However, the gabapentin was not helpful and patient relates increasing pain affecting the right lower extremity.  This pain seems to be radiating from the right hip and extending into the calf and leg.    On exam, there is no pain with palpation of the right calf.  Differential includes right hip pain secondary to bursitis, arthritis, and sciatica.    Patient was referred to orthopedics to evaluate the right hip problem.  He states that he has been trying to stretch and feels it provides a modicum of relief but did not recommend aggressive physical therapy until diagnosis has been provided.

## 2024-12-10 ENCOUNTER — APPOINTMENT (OUTPATIENT)
Dept: RADIOLOGY | Age: 49
End: 2024-12-10
Payer: COMMERCIAL

## 2024-12-10 ENCOUNTER — OFFICE VISIT (OUTPATIENT)
Dept: OBGYN CLINIC | Facility: CLINIC | Age: 49
End: 2024-12-10
Payer: COMMERCIAL

## 2024-12-10 VITALS
DIASTOLIC BLOOD PRESSURE: 92 MMHG | SYSTOLIC BLOOD PRESSURE: 136 MMHG | BODY MASS INDEX: 29.77 KG/M2 | WEIGHT: 201 LBS | HEIGHT: 69 IN | HEART RATE: 87 BPM

## 2024-12-10 DIAGNOSIS — M25.551 PAIN IN RIGHT HIP: ICD-10-CM

## 2024-12-10 DIAGNOSIS — M25.551 PAIN OF RIGHT HIP: ICD-10-CM

## 2024-12-10 DIAGNOSIS — M54.16 LUMBAR RADICULOPATHY: Primary | ICD-10-CM

## 2024-12-10 PROCEDURE — 73502 X-RAY EXAM HIP UNI 2-3 VIEWS: CPT

## 2024-12-10 PROCEDURE — 99244 OFF/OP CNSLTJ NEW/EST MOD 40: CPT | Performed by: STUDENT IN AN ORGANIZED HEALTH CARE EDUCATION/TRAINING PROGRAM

## 2024-12-10 RX ORDER — METHYLPREDNISOLONE 4 MG/1
TABLET ORAL
Qty: 21 TABLET | Refills: 0 | Status: SHIPPED | OUTPATIENT
Start: 2024-12-10

## 2024-12-10 RX ORDER — METHYLPREDNISOLONE 4 MG/1
TABLET ORAL
Status: CANCELLED | OUTPATIENT
Start: 2024-12-10

## 2024-12-10 NOTE — PROGRESS NOTES
Date: 12/10/24  Oleg Dc   MRN# [id number]  : 1975      Chief Complaint: Right hip pain; right sided low back pain with sciatica     Assessment and Plan:    Lumbar radiculopathy  Patient has a history, physical examination and radiographic evaluation consistent with right-sided radiculopathy secondary to likely discogenic dysfunction    -Weightbearing as tolerated right lower extremity  -Tylenol 1000 mg 3 times daily  -Medrol Dosepak prescribed  -Gabapentin 300 mg at bedtime recommended but deferred at this time  -May continue home exercise program  -Relafen 750 mg twice daily  -Referral made to spine and pain for continued evaluation and treatment  -Patient may follow-up as needed         Subjective:     Hip Pain  Patient presents as a referral from Dr. Bajwa with complaints of right hip pain. This is evaluated as a personal injury. The pain began several months ago. He tripped in July causing a couple ligaments in his left big toe to rupture. Patient states that he has been compensating with his right hip until his big toe pain resolved. The pain is located buttock and lateral and is made worse with walking, sitting, and sleeping on it.  He describes the symptoms as numbing, sharp, and shooting. Symptoms improve with rest, heat, ice, HEP. The symptoms are worse with activity, stair climbing, weight bearing, sitting for prolonged periods of time. The hip has not given out or felt unstable.  The patient is active in none. Treatment to date has been ice, heat, Tylenol, NSAID's, HEP, without significant relief.    External Records Reviewed: historical medical records and radiographic reports    Allergy:  Allergies   Allergen Reactions    Penicillins      Medications:  all current active meds have been reviewed  Past Medical History:  Past Medical History:   Diagnosis Date    Achrochordon     Resolved 3/25/2016     Anxiety 2022    Genital warts     Resolved 2014     Neoplasm of  "uncertain behavior of skin     Resolved 3/25/2016     Numbness of left anterior thigh     Resolved 2016      Past Surgical History:  History reviewed. No pertinent surgical history.  Family History:  Family History   Problem Relation Age of Onset    No Known Problems Mother     No Known Problems Father     No Known Problems Brother      Social History:  Social History     Substance and Sexual Activity   Alcohol Use Yes    Alcohol/week: 1.0 standard drink of alcohol    Types: 1 Glasses of wine per week     Social History     Substance and Sexual Activity   Drug Use No     Social History     Tobacco Use   Smoking Status Former    Current packs/day: 0.00    Average packs/day: 0.3 packs/day for 4.0 years (1.0 ttl pk-yrs)    Types: Cigarettes    Start date:     Quit date: 2018    Years since quittin.9   Smokeless Tobacco Never           ROS:   Review of Systems   Constitutional:  Negative for chills and fever.   HENT:  Negative for ear pain and sore throat.    Eyes:  Negative for pain and visual disturbance.   Respiratory:  Negative for cough and shortness of breath.    Cardiovascular:  Negative for chest pain and palpitations.   Gastrointestinal:  Negative for abdominal pain and vomiting.   Genitourinary:  Negative for dysuria and hematuria.   Musculoskeletal:  Negative for arthralgias and back pain.   Skin:  Negative for color change and rash.   Neurological:  Negative for seizures and syncope.   All other systems reviewed and are negative.       Objective:   BP Readings from Last 1 Encounters:   12/10/24 136/92      Wt Readings from Last 1 Encounters:   12/10/24 91.2 kg (201 lb)      Pulse Readings from Last 1 Encounters:   12/10/24 87      BMI: Estimated body mass index is 29.68 kg/m² as calculated from the following:    Height as of this encounter: 5' 9\" (1.753 m).    Weight as of this encounter: 91.2 kg (201 lb).      Physical Exam  Constitutional:       General: He is not in acute distress.  HENT:     "  Head: Normocephalic and atraumatic.   Eyes:      Conjunctiva/sclera: Conjunctivae normal.   Cardiovascular:      Comments: Extremities well perfused   No LE edema    Pulmonary:      Effort: Pulmonary effort is normal.   Abdominal:      General: Abdomen is flat. Bowel sounds are normal.   Neurological:      Mental Status: He is alert. Mental status is at baseline.          Gait and Station:   normal      Right Hip     Inspection: normal color, temperature, turgor and moisture    Range of Motion: Full without pain    negative  log roll  negative Trendelenburg sign  negative  Stinchfield  negative  SUKHDEV  negative  FADDIR  Positive straight leg raise    Motor: 5/5 Q/HS/TA/GS/EHL/FHL    Vascular: Toes WWP with BCR    SILT DP/SP/Peter/Saph/Tib    Images:    I personally reviewed relevant images in the PACS system and my interpretation is as follows:  X-rays of the right Hip reveal no acute fractures or osseous deformities of the right hip.     X-ray of the lumbar spine from 9/21/24 was reviewed and revealed no spondylolisthesis.    Scribe Attestation      I,:  King Richter am acting as a scribe while in the presence of the attending physician.:       I,:  Andres Prasad MD personally performed the services described in this documentation    as scribed in my presence.:                Andres Prasad MD  Adult Reconstruction Specialist   [facility]

## 2024-12-10 NOTE — ASSESSMENT & PLAN NOTE
Patient has a history, physical examination and radiographic evaluation consistent with right-sided radiculopathy secondary to likely discogenic dysfunction    -Weightbearing as tolerated right lower extremity  -Tylenol 1000 mg 3 times daily  -Medrol Dosepak prescribed  -Gabapentin 300 mg at bedtime recommended but deferred at this time  -May continue home exercise program  -Relafen 750 mg twice daily  -Referral made to spine and pain for continued evaluation and treatment  -Patient may follow-up as needed

## 2025-01-06 ENCOUNTER — EVALUATION (OUTPATIENT)
Dept: PHYSICAL THERAPY | Facility: CLINIC | Age: 50
End: 2025-01-06
Payer: COMMERCIAL

## 2025-01-06 DIAGNOSIS — M54.16 LUMBAR RADICULOPATHY: Primary | ICD-10-CM

## 2025-01-06 PROCEDURE — 97162 PT EVAL MOD COMPLEX 30 MIN: CPT | Performed by: PHYSICAL THERAPIST

## 2025-01-06 PROCEDURE — 97110 THERAPEUTIC EXERCISES: CPT | Performed by: PHYSICAL THERAPIST

## 2025-01-06 NOTE — PROGRESS NOTES
PT Evaluation     Today's date: 2025  Patient name: Oleg Dc  : 1975  MRN: [id number]  Referring provider: Andres Prasad MD  Dx:   Encounter Diagnosis     ICD-10-CM    1. Lumbar radiculopathy  M54.16 Ambulatory Referral to Physical Therapy                     Assessment  Impairments: abnormal muscle tone, abnormal or restricted ROM, abnormal movement, activity intolerance, impaired physical strength and pain with function    Assessment details: Oleg Dc is a 49 y.o. male presenting to physical therapy with chronic right sided lumbar radiculopathy and presents with pain, decreased range of motion, and decreased activity tolerance.  Assessment indicates a lumbar derangement without a directional preference.  Secondary to these impairments, patient has increased difficulty performing ADL's, household chores, and  work related tasks.  Oleg would benefit from skilled PT to address these issues and maximize function.  Thank you for the referral.    Understanding of Dx/Px/POC: excellent     Prognosis: excellent    Goals  Short Term Goals: to be achieved by 4 weeks  1) Patient to be independent with initial HEP  2) Decrease pain to < or equal to 4/10 at its worst  3) Increase lumbar spine ROM by 25% in all deficient planes  4) Patient to report decreased sleep interruption secondary to pain    Long Term Goals: to be achieved by discharge  1) Increase FOTO score > or equal to expected outcome  2) Patient to be independent with comprehensive HEP  3) Abolish pain for improved quality of life  4) Lumbar spine AROM WNL all planes to improve a/iadls  5) Patient to report no sleep interruption secondary to pain    Plan  Patient would benefit from: skilled PT  Planned modality interventions: TENS and thermotherapy: hydrocollator packs    Planned therapy interventions: joint mobilization, manual therapy, neuromuscular re-education, patient education, strengthening, stretching, therapeutic exercise,  home exercise program, ADL training and abdominal trunk stabilization    Frequency: 2x week  Duration in weeks: 8  Treatment plan discussed with: patient        Subjective Evaluation    History of Present Illness  Mechanism of injury: Patient reports to outpatient PT secondary to the onset of right sided LE pain that began following gait compensations after injuring ligaments in his big toe following an injury on the left side when he ran into a coffee table in july.  Patient also notes performing squats with most of the weight through his right side while gardening which triggered his symptoms.  Patient describes the pain as sharp and shooting into the buttock/lateral hip which is worse with general movement and prolonged standing and improved with rest/heat and Medrol Dosepak.  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  Patients goals for PT are to decrease the pain and return to PLOF.            Not a recurrent problem   Quality of life: good    Patient Goals  Patient goals for therapy: increased strength, independence with ADLs/IADLs, return to sport/leisure activities, increased motion and decreased pain    Pain  Current pain ratin  At best pain ratin  At worst pain ratin  Quality: radiating and sharp  Relieving factors: rest and relaxation  Aggravating factors: standing and walking  Progression: worsening    Social Support  Steps to enter house: yes  Stairs in house: yes   Lives in: multiple-level home    Employment status: working  Hand dominance: right      Diagnostic Tests  X-ray: normal (hip and lumbar spine)        Objective     Concurrent Complaints  Negative for night pain, bladder dysfunction, bowel dysfunction and saddle (S4) numbness    Neurological Testing     Sensation     Lumbar   Left   Intact: light touch    Right   Intact: light touch    Reflexes   Left   Patellar (L4): normal (2+)  Achilles (S1):  normal (2+)  Clonus sign: negative    Right   Patellar (L4): normal (2+)  Achilles (S1): normal (2+)  Clonus sign: negative    Active Range of Motion     Lumbar   Flexion:  with pain Restriction level: moderate  Extension:  with pain Restriction level: moderate  Left lateral flexion:  Restriction level: minimal  Right lateral flexion:  Restriction level: minimal    Joint Play     Hypomobile: L1, L2, L3, L4, L5 and S1     Pain: L4, L5 and S1   Mechanical Assessment    Cervical      Thoracic      Lumbar    Standing flexion: repeated movements   Pain location:peripheralized  Pain intensity: worse  Standing extension: repeated movements  Pain location: peripheralized  Pain intensity: worse  Lying extension: repeated movements  Pain location: peripheralized  Pain intensity: worse    Strength/Myotome Testing     Left Hip   Planes of Motion   Flexion: 4+  External rotation: 4+  Internal rotation: 4+    Right Hip   Planes of Motion   Flexion: 4+  External rotation: 4+  Internal rotation: 4+    Left Knee   Flexion: 4+  Extension: 4+    Right Knee   Flexion: 4+  Extension: 4+    Left Ankle/Foot   Dorsiflexion: 4+  Plantar flexion: 4+  Great toe extension: 4+    Right Ankle/Foot   Dorsiflexion: 4+  Plantar flexion: 4+  Great toe extension: 4+    Muscle Activation   Patient able to activate left transverse abdominals, left multifidus, right transverse abdominals and right multifidus.     Additional Muscle Activation Details  MMT:      TA = 3+/5  Multifidus = 4-/5    Tests     Lumbar     Right   Positive passive SLR and slump test.     Right Hip   Negative SUKHDEV and FADIR.              Precautions:   Patient Active Problem List   Diagnosis    Condyloma    Chronic right shoulder pain    Right hip pain    Fatigue    Anxiety    Foot injury, left, initial encounter    Lumbar radiculopathy           Manuals 1/6                                                                Neuro Re-Ed             Dead bugs             Bird dogs        "      Paloff press                                                                 Ther Ex             Recumbent bike warm up             Benjamin pose stretch 10 x10\"            R supine sciatic nerve glide 2x10            TA activation 10 x10\"            Bar hangs for lumbar distraction HEP                                                   Ther Activity                                       Gait Training                                       Modalities             Lumbar Traction                               "

## 2025-01-13 ENCOUNTER — CONSULT (OUTPATIENT)
Dept: PAIN MEDICINE | Facility: MEDICAL CENTER | Age: 50
End: 2025-01-13
Payer: COMMERCIAL

## 2025-01-13 VITALS — WEIGHT: 208 LBS | BODY MASS INDEX: 30.81 KG/M2 | HEIGHT: 69 IN

## 2025-01-13 DIAGNOSIS — G57.01 PIRIFORMIS SYNDROME OF RIGHT SIDE: Primary | ICD-10-CM

## 2025-01-13 DIAGNOSIS — M54.16 LUMBAR RADICULOPATHY: ICD-10-CM

## 2025-01-13 DIAGNOSIS — M79.672 LEFT FOOT PAIN: ICD-10-CM

## 2025-01-13 DIAGNOSIS — M79.18 MYOFASCIAL PAIN SYNDROME: ICD-10-CM

## 2025-01-13 PROCEDURE — G2211 COMPLEX E/M VISIT ADD ON: HCPCS | Performed by: PHYSICAL MEDICINE & REHABILITATION

## 2025-01-13 PROCEDURE — 99244 OFF/OP CNSLTJ NEW/EST MOD 40: CPT | Performed by: PHYSICAL MEDICINE & REHABILITATION

## 2025-01-13 RX ORDER — MELOXICAM 7.5 MG/1
7.5 TABLET ORAL DAILY PRN
Qty: 30 TABLET | Refills: 0 | Status: SHIPPED | OUTPATIENT
Start: 2025-01-13

## 2025-01-13 NOTE — PROGRESS NOTES
Assessment  1. Piriformis syndrome of right side    2. Lumbar radiculopathy    3. Myofascial pain syndrome        Plan  Piriformis injection  Continue PT  Consider lumbar spine MRI at follow up/completion of PT    My impressions and treatment recommendations were discussed in detail with the patient who verbalized understanding and had no further questions.  Discharge instructions were provided. I personally saw and examined the patient and I agree with the above discussed plan of care.    Orders Placed This Encounter   Procedures    FL spine and pain procedure     Standing Status:   Future     Expiration Date:   1/13/2026     Reason for Exam::   (R) piriformis injection     Anticoagulant hold needed?:   no     No orders of the defined types were placed in this encounter.      History of Present Illness    Oleg Dc is a 49 y.o. male seen in consultation at the request of Dr. Prasad regarding radicular pain throughout the right lower extremity.  Patient's been experiencing symptoms related to an injury that occurred in July.  He states that he injured his left foot and subsequently was compensating by putting more pressure on the left side.  He is currently describing moderate intensity pain rated as a 4/10.  Pain is constant and worse in the evening this is characterized as shooting numbness pins-and-needles dull and aching.  He denies any lower extremity weakness and does not currently require an assistive device.    Aggravating factors include lying down standing bending walking exercise and coughing.  Alleviating factors are none.    Diagnostic studies include x-rays of the lumbar spine which were reviewed and are unremarkable.    He did initiate physical therapy and noted some moderate relief while participating.    Social history negative for tobacco and marijuana use positive for social alcohol consumption.    He did get some significant relief with an oral steroid course and has found some benefit  from meloxicam.  Did not feel that gabapentin helped at all.    I have personally reviewed and/or updated the patient's past medical history, past surgical history, family history, social history, current medications, allergies, and vital signs today.     Review of Systems   Constitutional:  Negative for fever and unexpected weight change.   HENT:  Negative for trouble swallowing.    Eyes:  Negative for visual disturbance.   Respiratory:  Negative for shortness of breath and wheezing.    Cardiovascular:  Negative for chest pain and palpitations.   Gastrointestinal:  Negative for constipation, diarrhea, nausea and vomiting.   Endocrine: Negative for cold intolerance, heat intolerance and polydipsia.   Genitourinary:  Negative for difficulty urinating and frequency.   Musculoskeletal:  Positive for back pain and gait problem. Negative for arthralgias, joint swelling and myalgias.   Skin:  Negative for rash.   Neurological:  Negative for dizziness, seizures, syncope, weakness and headaches.   Hematological:  Does not bruise/bleed easily.   Psychiatric/Behavioral:  Negative for dysphoric mood.    All other systems reviewed and are negative.      Patient Active Problem List   Diagnosis    Condyloma    Chronic right shoulder pain    Right hip pain    Fatigue    Anxiety    Foot injury, left, initial encounter    Lumbar radiculopathy       Past Medical History:   Diagnosis Date    Achrochordon     Resolved 3/25/2016     Anxiety 08/08/2022    Arthritis     Genital warts     Resolved 11/22/2014     Neoplasm of uncertain behavior of skin     Resolved 3/25/2016     Numbness of left anterior thigh     Resolved 6/29/2016        History reviewed. No pertinent surgical history.    Family History   Problem Relation Age of Onset    No Known Problems Mother     No Known Problems Father     No Known Problems Brother        Social History     Occupational History    Not on file   Tobacco Use    Smoking status: Former     Current  packs/day: 0.00     Average packs/day: 0.3 packs/day for 4.0 years (1.0 ttl pk-yrs)     Types: Cigarettes     Start date:      Quit date:      Years since quittin.0    Smokeless tobacco: Never   Vaping Use    Vaping status: Some Days    Start date: 5/10/2018    Substances: Nicotine, THC   Substance and Sexual Activity    Alcohol use: Yes     Alcohol/week: 1.0 standard drink of alcohol     Types: 1 Glasses of wine per week    Drug use: Yes     Types: Marijuana    Sexual activity: Yes     Partners: Female       Current Outpatient Medications on File Prior to Visit   Medication Sig    meloxicam (MOBIC) 7.5 mg tablet Take 1 tablet (7.5 mg total) by mouth daily as needed for moderate pain    ALPRAZolam (XANAX) 0.5 mg tablet Take 1 tablet (0.5 mg total) by mouth once as needed for anxiety (before procedure) for up to 1 dose (Patient not taking: Reported on 3/17/2023)    gabapentin (Neurontin) 300 mg capsule Take 1 capsule (300 mg total) by mouth daily at bedtime (Patient not taking: Reported on 2024)    ibuprofen (MOTRIN) 100 mg/5 mL suspension Take by mouth every 6 (six) hours as needed for mild pain (Patient not taking: Reported on 2023)    ibuprofen (MOTRIN) 200 mg tablet Take by mouth every 6 (six) hours as needed for mild pain (Patient not taking: Reported on 2024)    ketoconazole (NIZORAL) 2 % shampoo Apply 1 Application topically 2 (two) times a week (Patient not taking: Reported on 2024)    methylPREDNISolone 4 MG tablet therapy pack Use as directed on package (Patient not taking: Reported on 2024)    methylPREDNISolone 4 MG tablet therapy pack Use as directed on package (Patient not taking: Reported on 2025)    nabumetone (RELAFEN) 750 mg tablet Take 1 tablet (750 mg total) by mouth 2 (two) times a day (Patient not taking: Reported on 2025)    naproxen (Naprosyn) 500 mg tablet Take 1 tablet (500 mg total) by mouth 2 (two) times a day with meals (Patient not taking:  "Reported on 8/13/2024)    NON FORMULARY Medical Marijuana- Vapes (Patient not taking: Reported on 8/30/2023)     Current Facility-Administered Medications on File Prior to Visit   Medication    lidocaine (XYLOCAINE) 1 % injection 1 mL    triamcinolone acetonide (KENALOG-40) 40 mg/mL injection 20 mg       Allergies   Allergen Reactions    Penicillins        Physical Exam    Ht 5' 9\" (1.753 m)   Wt 94.3 kg (208 lb)   BMI 30.72 kg/m²     Constitutional: normal, well developed, well nourished, alert, in no distress and non-toxic and no overt pain behavior.  Eyes: anicteric  HEENT: grossly intact  Neck: supple, symmetric, trachea midline and no masses   Pulmonary:even and unlabored  Cardiovascular:No edema or pitting edema present  Skin:Normal without rashes or lesions and well hydrated  Psychiatric:Mood and affect appropriate  Neurologic:Cranial Nerves II-XII grossly intact, bilateral lower extremity muscle touch reflexes are diminished at the knees and ankles, positive straight leg raise from a seated position on the right, bilateral lower extremity strength is normal  Musculoskeletal:normal, except for positive FADIR test, there is some tenderness to palpation over the piriformis musculature on the right, there is some reproduction of radicular symptoms into the right lower extremity with lumbar extension as well as extension with rotation towards the right    Imaging    XR spine lumbar minimum 4 views non injury: Result Notes     Edvin Haynes DO  9/23/2024  8:02 AM EDT       X-ray of low back normal.            Study Result    Narrative & Impression   XR SPINE LUMBAR MINIMUM 4 VIEWS NON INJURY     INDICATION: M79.604: Pain in right leg.     COMPARISON: None     FINDINGS:     No acute fracture. Intact pedicles.     Five non-rib-bearing lumbar vertebral bodies.     Normal alignment.     No significant degenerative changes.     Unremarkable soft tissues.     IMPRESSION:     No acute osseous abnormality.      "   Computerized Assisted Algorithm (CAA) may have been used to analyze all applicable images.        Workstation performed: [id number]

## 2025-01-15 ENCOUNTER — APPOINTMENT (OUTPATIENT)
Dept: PHYSICAL THERAPY | Facility: CLINIC | Age: 50
End: 2025-01-15
Payer: COMMERCIAL

## 2025-01-20 ENCOUNTER — OFFICE VISIT (OUTPATIENT)
Dept: PHYSICAL THERAPY | Facility: CLINIC | Age: 50
End: 2025-01-20
Payer: COMMERCIAL

## 2025-01-20 DIAGNOSIS — M54.16 LUMBAR RADICULOPATHY: Primary | ICD-10-CM

## 2025-01-20 PROCEDURE — 97110 THERAPEUTIC EXERCISES: CPT | Performed by: PHYSICAL THERAPIST

## 2025-01-20 PROCEDURE — 97112 NEUROMUSCULAR REEDUCATION: CPT | Performed by: PHYSICAL THERAPIST

## 2025-01-20 NOTE — PROGRESS NOTES
"Daily Note     Today's date: 2025  Patient name: Oleg Dc  : 1975  MRN: [id number]  Referring provider: Andres Prasad MD  Dx:   Encounter Diagnosis     ICD-10-CM    1. Lumbar radiculopathy  M54.16                      Subjective: Patient reports reduction in pain since IE and has been compliant with his HEP.  States that he had a consultation with Dr. Cartwright with a scheduled injection in February.       Objective: See treatment diary below      Assessment: Patient demonstrates proper form and mechanics with his HEP and is responding favorably.  Began core progressions which were tolerated well requiring minimal cues to maintain pelvic alignment and control.  Updated HEP accordingly.        Plan: Continue per plan of care.      Precautions:   Patient Active Problem List   Diagnosis    Condyloma    Chronic right shoulder pain    Right hip pain    Fatigue    Anxiety    Foot injury, left, initial encounter    Lumbar radiculopathy           Manuals                                                                Neuro Re-Ed             Dead bugs  3x10           Bird dogs  2x10 B/L           Paloff press - green  2x10 B/L           Side hip abduction  3x10 B/L                                                  Ther Ex             Recumbent bike warm up  L1 5'           Benjamin pose stretch 10 x10\" 10 x10\"           R supine sciatic nerve glide 2x10 2x10           TA activation 10 x10\" 5x10\"           Bar hangs for lumbar distraction HEP                                                   Ther Activity                                       Gait Training                                       Modalities             Lumbar Traction                               "

## 2025-01-27 ENCOUNTER — OFFICE VISIT (OUTPATIENT)
Dept: PHYSICAL THERAPY | Facility: CLINIC | Age: 50
End: 2025-01-27
Payer: COMMERCIAL

## 2025-01-27 DIAGNOSIS — M54.16 LUMBAR RADICULOPATHY: Primary | ICD-10-CM

## 2025-01-27 PROCEDURE — 97110 THERAPEUTIC EXERCISES: CPT

## 2025-01-27 PROCEDURE — 97112 NEUROMUSCULAR REEDUCATION: CPT

## 2025-01-27 NOTE — PROGRESS NOTES
"Daily Note     Today's date: 2025  Patient name: Oleg Dc  : 1975  MRN: [id number]  Referring provider: Andres Prasad MD  Dx:   Encounter Diagnosis     ICD-10-CM    1. Lumbar radiculopathy  M54.16                        Subjective: Patient admitted that radicular symptoms have reduced in intensity but still remain as frequent. Has been compliant with his HEP on top of other stretches. He is consider pushing his injection back as he is seeing an improvement with conservative approach.      Objective: See treatment diary below.      Assessment: Continues to be making positive progress with current core and hip strengthening with ability to maintain proper pelvic stabilization/control with more dynamic core activities and no worsening of radicular symptoms reported. Fatigue limited completion of sidelying hip abduction which he also struggled with at home due to fatigue. Encouraged continued HEP compliance.      Plan: Continue per plan of care.          Precautions:   Patient Active Problem List   Diagnosis    Condyloma    Chronic right shoulder pain    Right hip pain    Fatigue    Anxiety    Foot injury, left, initial encounter    Lumbar radiculopathy       Manuals                                                               Neuro Re-Ed             Dead bugs  3x10 3x10          Bird dogs  2x10 B/L 2x10  bilat          Paloff press - green  2x10 B/L GTB  2x10  bilat          Side hip abduction  3x10 B/L 2x10 bilat                                                 Ther Ex             Recumbent bike warm up  L1 5' 5 min  L1          Benjamin pose stretch 10 x10\" 10 x10\" 10\"x  10          R supine sciatic nerve glide 2x10 2x10 3x10          TA activation 10 x10\" 5x10\"           Bridge with feet on BOSU   3x10          Bar hangs for lumbar distraction HEP                                                   Ther Activity                                       Gait Training                    "                    Modalities             Lumbar Traction

## 2025-01-28 ENCOUNTER — TELEPHONE (OUTPATIENT)
Age: 50
End: 2025-01-28

## 2025-02-03 ENCOUNTER — OFFICE VISIT (OUTPATIENT)
Dept: PHYSICAL THERAPY | Facility: CLINIC | Age: 50
End: 2025-02-03
Payer: COMMERCIAL

## 2025-02-03 DIAGNOSIS — M54.16 LUMBAR RADICULOPATHY: Primary | ICD-10-CM

## 2025-02-03 PROCEDURE — 97112 NEUROMUSCULAR REEDUCATION: CPT | Performed by: PHYSICAL THERAPIST

## 2025-02-03 PROCEDURE — 97110 THERAPEUTIC EXERCISES: CPT | Performed by: PHYSICAL THERAPIST

## 2025-02-03 NOTE — PROGRESS NOTES
"Daily Note     Today's date: 2/3/2025  Patient name: Oleg Dc  : 1975  MRN: [id number]  Referring provider: Andres Prasad MD  Dx:   Encounter Diagnosis     ICD-10-CM    1. Lumbar radiculopathy  M54.16                        Subjective: Patient reports HEP compliance and notes having some good and some bad days but overall is improving.      Objective: See treatment diary below.      Assessment: Patient demonstrating steady improvement in pelvic control with core progressions.  Progressed to tabletop plank with ball which was tolerated well and able to complete all exercise progressions with pain or compensations.  Gluteus medius remains weak with occasional TFL compensation but also demonstrates steady strength gain.        Plan: Continue per plan of care.          Precautions:   Patient Active Problem List   Diagnosis    Condyloma    Chronic right shoulder pain    Right hip pain    Fatigue    Anxiety    Foot injury, left, initial encounter    Lumbar radiculopathy       Manuals 1/6 1/20 1/27 2/3                                                             Neuro Re-Ed             Dead bugs  3x10 3x10 3x10         Bird dogs  2x10 B/L 2x10  bilat 2x10 B/L         Paloff press - blue  2x10 B/L GTB  2x10  bilat 2x10 B/L + 10 B/L half kneel         Side hip abduction  3x10 B/L 2x10 bilat 3x10 B/L         Front plank with ball on table + hip extension    2x10 B/L                                   Ther Ex             Recumbent bike warm up  L1 5' 5 min  L1 L3 5'         Benjamin pose stretch 10 x10\" 10 x10\" 10\"x  10 10 x10\"         R supine sciatic nerve glide 2x10 2x10 3x10 3x10         TA activation 10 x10\" 5x10\"           Bridge with feet on BOSU   3x10 3x10         Bar hangs for lumbar distraction HEP                                                   Ther Activity                                       Gait Training                                       Modalities             Lumbar Traction           "

## 2025-02-17 ENCOUNTER — TELEPHONE (OUTPATIENT)
Age: 50
End: 2025-02-17

## 2025-02-17 ENCOUNTER — OFFICE VISIT (OUTPATIENT)
Dept: PHYSICAL THERAPY | Facility: CLINIC | Age: 50
End: 2025-02-17
Payer: COMMERCIAL

## 2025-02-17 DIAGNOSIS — M54.16 LUMBAR RADICULOPATHY: Primary | ICD-10-CM

## 2025-02-17 PROCEDURE — 97110 THERAPEUTIC EXERCISES: CPT | Performed by: PHYSICAL THERAPIST

## 2025-02-17 PROCEDURE — 97140 MANUAL THERAPY 1/> REGIONS: CPT | Performed by: PHYSICAL THERAPIST

## 2025-02-17 NOTE — TELEPHONE ENCOUNTER
Caller: Oleg     Doctor and/or Office: Dr. Bajwa    #: [phone]    Escalation: Appointment Patient called in and stated he is having a lot of pain and issues walking on his right heel  feel like there is something in his heel   There is nothing available until April in [place] he is willing to go to any  office  Thank you

## 2025-02-17 NOTE — PROGRESS NOTES
"Daily Note     Today's date: 2025  Patient name: Oleg Dc  : 1975  MRN: [id number]  Referring provider: Andres Prasad MD  Dx:   Encounter Diagnosis     ICD-10-CM    1. Lumbar radiculopathy  M54.16                          Subjective: Patient reports an overall 25% reduction in nerve pain but states that he remains limited by pain in the achilles on the right.  Also notes a patch of burning/discomfort of the left thigh that is no longer numb.      Objective: See treatment diary below.      Assessment: Performed an assessment to determine the source of achilles pain (tendinopathy versus sciatic nerve).  Assessment indicates achilles teninopathy given reproduction of symptoms via achilles palpation, stretching and eccentrics versus neural tensioning.  Added lumbar extensions to be performed for additional centralization benefit.       Plan: Continue per plan of care.          Precautions:   Patient Active Problem List   Diagnosis    Condyloma    Chronic right shoulder pain    Right hip pain    Fatigue    Anxiety    Foot injury, left, initial encounter    Lumbar radiculopathy       Manuals 1/6 1/20 1/27 2/3 2/17        Assessment     15'                                               Neuro Re-Ed             Dead bugs  3x10 3x10 3x10         Bird dogs  2x10 B/L 2x10  bilat 2x10 B/L         Paloff press - blue  2x10 B/L GTB  2x10  bilat 2x10 B/L + 10 B/L half kneel         Side hip abduction  3x10 B/L 2x10 bilat 3x10 B/L         Front plank with ball on table + hip extension    2x10 B/L                                   Ther Ex             Recumbent bike warm up  L1 5' 5 min  L1 L3 5' L3 5'        Benjamin pose stretch 10 x10\" 10 x10\" 10\"x  10 10 x10\" 10 x10\"        R supine sciatic nerve glide 2x10 2x10 3x10 3x10 3x10        TA activation 10 x10\" 5x10\"           Bridge with feet on BOSU   3x10 3x10         Bar hangs for lumbar distraction HEP            DEEPTHI     2x10 x10\"                             "      Ther Activity                                       Gait Training                                       Modalities             Lumbar Traction

## 2025-02-20 ENCOUNTER — TELEPHONE (OUTPATIENT)
Age: 50
End: 2025-02-20

## 2025-02-21 DIAGNOSIS — M79.672 LEFT FOOT PAIN: ICD-10-CM

## 2025-02-21 RX ORDER — MELOXICAM 7.5 MG/1
7.5 TABLET ORAL DAILY PRN
Qty: 30 TABLET | Refills: 0 | Status: SHIPPED | OUTPATIENT
Start: 2025-02-21

## 2025-02-21 NOTE — TELEPHONE ENCOUNTER
Pt aware of same. Per pt he actually did not need a refill, he has a full bottle left. Pt will let pharmacy know that he does not need this on automatic refill.  Pt aware to not take with any other NSAIDS and may take tylenol with it if needed and not contraindicated for him.    Per pt he needs to call back later to the  to change his upcoming appt.

## 2025-02-24 ENCOUNTER — TELEPHONE (OUTPATIENT)
Age: 50
End: 2025-02-24

## 2025-02-24 NOTE — TELEPHONE ENCOUNTER
Caller: Patient    Doctor: Dr. Cartwright    Reason for call: patient needs to reschedule procedure for Friday due to     Please assist    Call back#: [phone]

## 2025-02-26 ENCOUNTER — OFFICE VISIT (OUTPATIENT)
Dept: PHYSICAL THERAPY | Facility: CLINIC | Age: 50
End: 2025-02-26
Payer: COMMERCIAL

## 2025-02-26 DIAGNOSIS — M54.16 LUMBAR RADICULOPATHY: Primary | ICD-10-CM

## 2025-02-26 PROCEDURE — 97110 THERAPEUTIC EXERCISES: CPT | Performed by: PHYSICAL THERAPIST

## 2025-02-26 PROCEDURE — 97140 MANUAL THERAPY 1/> REGIONS: CPT | Performed by: PHYSICAL THERAPIST

## 2025-02-26 NOTE — PROGRESS NOTES
"Daily Note     Today's date: 2025  Patient name: Oleg Dc  : 1975  MRN: [id number]  Referring provider: Andres Prasad MD  Dx:   Encounter Diagnosis     ICD-10-CM    1. Lumbar radiculopathy  M54.16                            Subjective: Patient reports reduced right LE symptoms overall.  Notes concern for left thigh symptoms that recently began per tingling.  Notes reduction of symptoms present with with deep knee bend      Objective: See treatment diary below.    (+) ITB tenderness present distally with gluteus medius weakness but no pain with bursal compression or palpation on the left    Assessment: Discussed ITB and the role of the gluteus medius to offload the ITB and the low back.  Began gluteus medius strengthening in addition to core/extension bias which was tolerated well.  Updated HEP accordingly.       Plan: Continue per plan of care.          Precautions:   Patient Active Problem List   Diagnosis    Condyloma    Chronic right shoulder pain    Right hip pain    Fatigue    Anxiety    Foot injury, left, initial encounter    Lumbar radiculopathy       Manuals 1/6 1/20 1/27 2/3 2/17 2/26       Assessment     15'                                               Neuro Re-Ed             Dead bugs  3x10 3x10 3x10  2x10 B/L blue TB       Bird dogs  2x10 B/L 2x10  bilat 2x10 B/L  2x10 B/L       Paloff press - blue  2x10 B/L GTB  2x10  bilat 2x10 B/L + 10 B/L half kneel         Side hip abduction  3x10 B/L 2x10 bilat 3x10 B/L         Front plank with ball on table + hip extension    2x10 B/L                                   Ther Ex             Recumbent bike warm up  L1 5' 5 min  L1 L3 5' L3 5' L3 5'       Benjamin pose stretch 10 x10\" 10 x10\" 10\"x  10 10 x10\" 10 x10\" 10 x10\"       R supine sciatic nerve glide 2x10 2x10 3x10 3x10 3x10 3x10       TA activation 10 x10\" 5x10\"           Bridge with feet on BOSU   3x10 3x10         Bar hangs for lumbar distraction HEP            DEEPTHI     2x10 x10\" 10 " "x10\"       Standing hip abduction - blue TB      3x10 B/L                    Ther Activity                                       Gait Training                                       Modalities             Lumbar Traction                               "

## 2025-03-19 ENCOUNTER — TELEPHONE (OUTPATIENT)
Age: 50
End: 2025-03-19

## 2025-03-19 NOTE — TELEPHONE ENCOUNTER
S/W pt.  Pt stated the pain is still the same and same area- right hip.  He stated the sciatic pain is better.  It is from the knee down and that changed.  He has been doing hip stretches with PT and thinking he may be doing too much.  See previous task.  Please advise.

## 2025-03-19 NOTE — TELEPHONE ENCOUNTER
Caller: ruel Dejesus    Doctor: Dr. Cartwright    Reason for call: pt is scheduled for a Rt Piriformis Injecton on 3/28 and pt would like to know if this is still the correct course of action.    pt has been doing PT for 2 months at this point and not getting any benefit from it.  Pt is asking if he could possibly get and MRI as he is still having a lot of pain in this area.  With it being several months since he was last seen is an injection still the next step?    Call back#: [phone]

## 2025-03-20 NOTE — TELEPHONE ENCOUNTER
Caller: ruel Dejesus    Doctor: Dr. Cartwright    Reason for call: pt returning the nurse's call    Call back#: [phone]

## 2025-03-20 NOTE — TELEPHONE ENCOUNTER
RN s/w pt regarding previous.  Pt would like to cx appt for 3/28 and is seeing JE on 4/2 to re evaluate.    Amna please cx 3/28 procedure thank you

## 2025-04-02 ENCOUNTER — OFFICE VISIT (OUTPATIENT)
Dept: PAIN MEDICINE | Facility: MEDICAL CENTER | Age: 50
End: 2025-04-02
Payer: COMMERCIAL

## 2025-04-02 VITALS — WEIGHT: 210 LBS | HEIGHT: 69 IN | BODY MASS INDEX: 31.1 KG/M2

## 2025-04-02 DIAGNOSIS — M54.16 RIGHT LUMBAR RADICULITIS: Primary | ICD-10-CM

## 2025-04-02 DIAGNOSIS — G57.01 PIRIFORMIS SYNDROME, RIGHT: ICD-10-CM

## 2025-04-02 DIAGNOSIS — Z12.11 ENCOUNTER FOR SCREENING COLONOSCOPY: ICD-10-CM

## 2025-04-02 DIAGNOSIS — G57.12 MERALGIA PARESTHETICA, LEFT: ICD-10-CM

## 2025-04-02 PROCEDURE — 99214 OFFICE O/P EST MOD 30 MIN: CPT | Performed by: PHYSICAL MEDICINE & REHABILITATION

## 2025-04-02 NOTE — PROGRESS NOTES
Assessment  1. Right lumbar radiculitis    2. Piriformis syndrome, right    3. Meralgia paresthetica, left    4. Encounter for screening colonoscopy        Plan  MRI of the lumbar spine is warranted at this point given the lack of response to conservative care with continued radicular features throughout the right lower extremity in an L5 or S1 distribution.  He is also describing symptomatology consistent with either upper lumbar radiculitis on the left or meralgia paresthetica.  EMG bilateral lower extremities  Once the imaging is available for review we will determine further treatment options.    My impressions and treatment recommendations were discussed in detail with the patient who verbalized understanding and had no further questions.  Discharge instructions were provided. I personally saw and examined the patient and I agree with the above discussed plan of care.    Orders Placed This Encounter   Procedures    MRI lumbar spine wo contrast     Standing Status:   Future     Expected Date:   4/2/2025     Expiration Date:   4/2/2029     Scheduling Instructions:      There is no preparation for this test. Please leave your jewelry and valuables at home, wedding rings are the exception. All patients will be required to change into a hospital gown and pants.  Street clothes are not permitted in the MRI.  Magnetic nail polish must be removed prior to arrival for your test. Please bring your insurance cards, a form of photo ID and a list of your medications with you. Arrive 15 minutes prior to your appointment time in order to register. Please bring any prior CT or MRI studies of this area that were not performed at a [facility].            To schedule this appointment, please contact Central Scheduling at [phone].            Prior to your appointment, please make sure you complete the MRI Screening Form when you e-Check in for your appointment. This will be available starting 7 days before your  appointment in [employer]. You may receive an e-mail with an activation code if you do not have a [employer] account. If you do not have access to a device, we will complete your screening at your appointment.     Reason for Exam:   back and radiating right leg pain, also with left thigh neuropathic pain     What is the patient's sedation requirement?:   No Sedation     Does the patient need medication for Claustrophobia? If yes, order medication at this point.:   No     Does the patient wear a life vest, have an implanted cardiac device, a stimulation device, a sleep apnea stimulator, or a breast tissue expansion device?:   No     Release to patient through [employer]:   Immediate    Ambulatory referral for colon cancer education     Standing Status:   Future     Expiration Date:   4/2/2026     Referral Priority:   Routine     Referral Type:   Consult - AMB     Referral Reason:   Specialty Services Required     Requested Specialty:   Gastroenterology     Number of Visits Requested:   1     Expiration Date:   4/2/2026     No orders of the defined types were placed in this encounter.      History of Present Illness    Oleg Dc is a 49 y.o. male returns in follow-up to evaluate his continued pain.  He has been participating in physical therapy as well as a home exercise program and notes worsening of symptoms when doing aggressive hip stretching.  We did offer him a right piriformis injection previously but he prefer to avoid interventions if possible.  At this point an MRI of the lumbar spine is necessary to help determine the etiology of his complaints as he continues with symptoms and is now experiencing some left-sided thigh numbness tingling and burning pain.  He rates the symptoms as a 3-8/10 which are constant described as dull aching numbness and pins-and-needles.  He notes that walking initially is painful but then improves and he is able to walk for about 2 miles.  He would like to continue his activity for  overall physical health.    I have personally reviewed and/or updated the patient's past medical history, past surgical history, family history, social history, current medications, allergies, and vital signs today.     Review of Systems   Constitutional:  Negative for chills and fever.   HENT:  Negative for ear pain, hearing loss, sinus pain and sore throat.    Eyes:  Negative for pain and redness.   Respiratory:  Negative for shortness of breath and wheezing.    Cardiovascular:  Negative for palpitations and leg swelling.   Gastrointestinal:  Negative for abdominal pain, constipation, nausea and vomiting.   Endocrine: Negative for polydipsia and polyuria.   Genitourinary:  Negative for difficulty urinating and hematuria.   Musculoskeletal:  Positive for arthralgias, back pain, gait problem and myalgias. Negative for joint swelling.   Skin:  Negative for rash.   Neurological:  Positive for numbness. Negative for dizziness, weakness and headaches.   Psychiatric/Behavioral:  Negative for confusion and sleep disturbance. The patient is not nervous/anxious.        Patient Active Problem List   Diagnosis    Condyloma    Chronic right shoulder pain    Right hip pain    Fatigue    Anxiety    Foot injury, left, initial encounter    Lumbar radiculopathy       Past Medical History:   Diagnosis Date    Achrochordon     Resolved 3/25/2016     Anxiety 08/08/2022    Arthritis     Genital warts     Resolved 11/22/2014     Neoplasm of uncertain behavior of skin     Resolved 3/25/2016     Numbness of left anterior thigh     Resolved 6/29/2016        History reviewed. No pertinent surgical history.    Family History   Problem Relation Age of Onset    No Known Problems Mother     No Known Problems Father     No Known Problems Brother        Social History     Occupational History    Not on file   Tobacco Use    Smoking status: Former     Current packs/day: 0.00     Average packs/day: 0.3 packs/day for 4.0 years (1.0 ttl pk-yrs)      Types: Cigarettes     Start date:      Quit date:      Years since quittin.2    Smokeless tobacco: Never   Vaping Use    Vaping status: Some Days    Start date: 5/10/2018    Substances: Nicotine, THC   Substance and Sexual Activity    Alcohol use: Yes     Alcohol/week: 1.0 standard drink of alcohol     Types: 1 Glasses of wine per week    Drug use: Yes     Types: Marijuana    Sexual activity: Yes     Partners: Female       Current Outpatient Medications on File Prior to Visit   Medication Sig    ALPRAZolam (XANAX) 0.5 mg tablet Take 1 tablet (0.5 mg total) by mouth once as needed for anxiety (before procedure) for up to 1 dose (Patient not taking: Reported on 3/17/2023)    gabapentin (Neurontin) 300 mg capsule Take 1 capsule (300 mg total) by mouth daily at bedtime (Patient not taking: Reported on 2024)    ibuprofen (MOTRIN) 100 mg/5 mL suspension Take by mouth every 6 (six) hours as needed for mild pain (Patient not taking: Reported on 2023)    ibuprofen (MOTRIN) 200 mg tablet Take by mouth every 6 (six) hours as needed for mild pain (Patient not taking: Reported on 2024)    ketoconazole (NIZORAL) 2 % shampoo Apply 1 Application topically 2 (two) times a week (Patient not taking: Reported on 2024)    meloxicam (MOBIC) 7.5 mg tablet TAKE 1 TABLET (7.5 MG TOTAL) BY MOUTH DAILY AS NEEDED FOR MODERATE PAIN (Patient not taking: Reported on 2025)    methylPREDNISolone 4 MG tablet therapy pack Use as directed on package (Patient not taking: Reported on 2024)    methylPREDNISolone 4 MG tablet therapy pack Use as directed on package (Patient not taking: Reported on 2025)    nabumetone (RELAFEN) 750 mg tablet Take 1 tablet (750 mg total) by mouth 2 (two) times a day (Patient not taking: Reported on 2025)    naproxen (Naprosyn) 500 mg tablet Take 1 tablet (500 mg total) by mouth 2 (two) times a day with meals (Patient not taking: Reported on 2024)    NON FORMULARY Medical  "Marijuana- Vapes (Patient not taking: Reported on 8/30/2023)     Current Facility-Administered Medications on File Prior to Visit   Medication    lidocaine (XYLOCAINE) 1 % injection 1 mL    triamcinolone acetonide (KENALOG-40) 40 mg/mL injection 20 mg       Allergies   Allergen Reactions    Penicillins        Physical Exam    Ht 5' 9\" (1.753 m)   Wt 95.3 kg (210 lb)   BMI 31.01 kg/m²     Constitutional: normal, well developed, well nourished, alert, in no distress and non-toxic and no overt pain behavior.  Eyes: anicteric  HEENT: grossly intact  Neck: supple, symmetric, trachea midline and no masses   Pulmonary:even and unlabored  Cardiovascular:No edema or pitting edema present  Skin:Normal without rashes or lesions and well hydrated  Psychiatric:Mood and affect appropriate  Neurologic:Cranial Nerves II-XII grossly intact  Musculoskeletal:normal, except for area of numbness and tingling in the left lateral thigh and also complains of right posterior lateral hip and leg pain consistent with either lumbar radiculitis or piriformis syndrome    Imaging  "

## 2025-04-17 ENCOUNTER — HOSPITAL ENCOUNTER (OUTPATIENT)
Facility: MEDICAL CENTER | Age: 50
Discharge: HOME | End: 2025-04-17
Payer: COMMERCIAL

## 2025-04-17 DIAGNOSIS — M54.16 RIGHT LUMBAR RADICULITIS: ICD-10-CM

## 2025-04-17 DIAGNOSIS — G57.12 MERALGIA PARESTHETICA, LEFT: ICD-10-CM

## 2025-04-17 DIAGNOSIS — G57.01 PIRIFORMIS SYNDROME, RIGHT: ICD-10-CM

## 2025-04-17 PROCEDURE — 72148 MRI LUMBAR SPINE W/O DYE: CPT

## 2025-04-24 ENCOUNTER — RESULTS FOLLOW-UP (OUTPATIENT)
Dept: PAIN MEDICINE | Facility: MEDICAL CENTER | Age: 50
End: 2025-04-24

## 2025-04-30 NOTE — TELEPHONE ENCOUNTER
Caller: Bhavin    Doctor: Dr. Cartwright    Reason for call: returning nurses phone call     Call back#: [phone]

## 2025-05-02 NOTE — TELEPHONE ENCOUNTER
Caller: Oleg    Doctor: Dr. Cartwright     Reason for call: pt returning call.     Call back#: [phone]

## 2025-05-07 NOTE — RESULT ENCOUNTER NOTE
My Chart message sent for patient to call me back DIRECTLY to schedule.  Left my DIRECT phone # [phone] on message.

## 2025-07-08 VITALS — WEIGHT: 208 LBS | BODY MASS INDEX: 30.81 KG/M2 | HEIGHT: 69 IN

## 2025-07-08 DIAGNOSIS — M54.16 LUMBAR RADICULOPATHY: Primary | ICD-10-CM

## 2025-07-08 PROCEDURE — 99214 OFFICE O/P EST MOD 30 MIN: CPT | Performed by: STUDENT IN AN ORGANIZED HEALTH CARE EDUCATION/TRAINING PROGRAM

## 2025-07-08 RX ORDER — METHYLPREDNISOLONE 4 MG/1
TABLET ORAL
Qty: 21 EACH | Refills: 0 | Status: SHIPPED | OUTPATIENT
Start: 2025-07-08

## 2025-07-08 NOTE — ASSESSMENT & PLAN NOTE
-Weightbearing as tolerated right lower extremity  -Tylenol 1000 mg 3 times daily  -Medrol Dosepak prescribed  -Gabapentin 300 mg at bedtime recommended but deferred at this time  -May continue home exercise program and walking activities which were beneficial in the past  -Relafen 750 mg twice daily  -Referral made to spine and pain for continued evaluation and treatment  -Patient may follow-up as needed  Orders:    Ambulatory referral to Spine & Pain Management; Future

## 2025-07-08 NOTE — PROGRESS NOTES
Date: 25  Oleg Dc   MRN# [id number]  : 1975      Chief Complaint: Left Hip Pain    Assessment and Plan:  The patient verbalized understanding of exam findings and treatment plan. We engaged in the shared decision-making process and treatment options were discussed at length with the patient. Surgical and conservative management discussed today along with risks and benefits. Patient was agreeable with the plan and all questions were answered to satisfaction.     Assessment & Plan  Lumbar radiculopathy  -Weightbearing as tolerated right lower extremity  -Tylenol 1000 mg 3 times daily  -Medrol Dosepak prescribed  -Gabapentin 300 mg at bedtime recommended but deferred at this time  -May continue home exercise program and walking activities which were beneficial in the past  -Relafen 750 mg twice daily  -Referral made to spine and pain for continued evaluation and treatment  -Patient may follow-up as needed  Orders:    Ambulatory referral to Spine & Pain Management; Future         Subjective:   Oleg cD is a 49 y.o. male who is being seen in follow-up for Left hip pain. When we last saw he we recommended Spine and Pain referral. He did follow up with Dr. Cartwright who ordered an MRI and recommended an S1 steroid injection. Patient did not proceed with this as he was not happy with his care.  Pain initially improved to 3-4/10 over the spring as he became more active, however has since worsened after cutting up a fallen tree. He complains of lower back pain and right hip pain and tightness. No other orthopedic complaints or concerns.       Prior treatment:  NSAIDs Yes    Physical Therapy Yes   Cortisone Injections No     Allergy:  Allergies[1]  Medications:  All current active meds have been reviewed   Past Medical History:  Past Medical History[2]  Past Surgical History:  Past Surgical History[3]  Family History:  Family History[4]  Social History:  Social History     Substance and Sexual  "Activity   Alcohol Use Yes    Alcohol/week: 1.0 standard drink of alcohol    Types: 1 Glasses of wine per week     Social History     Substance and Sexual Activity   Drug Use Yes    Types: Marijuana     Tobacco Use History[5]        Review of Systems:  General- denies fever/chills  HEENT- denies hearing loss or sore throat  Eyes- denies eye pain or visual disturbances, denies red eyes  Respiratory- denies cough or SOB  Cardio- denies chest pain or palpitations  GI- denies abdominal pain  Endocrine- denies urinary frequency  Urinary- denies pain with urination  Musculoskeletal- Negative except noted above  Skin- denies rashes or wounds  Neurological- denies dizziness or headache  Psychiatric- denies anxiety or difficulty concentrating    Objective:   BP Readings from Last 1 Encounters:   12/10/24 136/92      Wt Readings from Last 1 Encounters:   07/08/25 94.3 kg (208 lb)      Pulse Readings from Last 1 Encounters:   12/10/24 87        BMI: Estimated body mass index is 30.72 kg/m² as calculated from the following:    Height as of this encounter: 5' 9\" (1.753 m).    Weight as of this encounter: 94.3 kg (208 lb).    Physical Exam  Ht 5' 9\" (1.753 m)   Wt 94.3 kg (208 lb)   BMI 30.72 kg/m²   General/Constitutional: No apparent distress: well-nourished and well developed.  Eyes: normal ocular motion  Cardio: RRR, Normal S1S2, No m/r/g.   Lymphatic: No appreciable lymphadenopathy  Respiratory: Non-labored breathing, CTA b/l no w/c/r  Vascular: No edema, swelling or tenderness, except as noted in detailed exam. Extremities well perfused. No LE edema  Integumentary: No impressive skin lesions present, except as noted in detailed exam.  Neuro: No ataxia or tremors noted  Psych: Normal mood and affect, oriented to person, place and time. Appropriate affect.  Musculoskeletal: Normal, except as noted in detailed exam and in HPI.    Gait and Station:   normal    Right Hip Exam    Inspection: normal color, temperature, turgor " and moisture    Range of Motion: within normal limits without pain    negative  log roll   negative Trendelenburg sign  negative  Stinchfield  negative  SUKHDEV  negative  FADDIR    Motor: 5/5 Q/HS/TA/GS/EHL/FHL    Vascular: Toes WWP with BCR    SILT DP/SP/Peter/Saph/Tib      Images:    I personally reviewed relevant images in the PACS system and my interpretation is as follows:    MRI available for review of lumbar spine was available for review which demonstrates:  Right paracentral disc protrusion at L5-S1 impinges the descending right S1 nerve root. Contact of the exiting right L5 nerve root is also present at this level.     I have spent a total time of 30 minutes in caring for this patient on the day of the visit/encounter including Diagnostic results, Prognosis, Risks and benefits of tx options, Instructions for management, Patient and family education, Importance of tx compliance, Risk factor reductions, Impressions, Counseling / Coordination of care, Documenting in the medical record, and Obtaining or reviewing history  .      Scribe Attestation      I,:  Madina Montes De Oca am acting as a scribe while in the presence of the attending physician.:       I,:  Andres Prasad MD personally performed the services described in this documentation    as scribed in my presence.:               Andres Prasad MD  Adult Reconstruction Specialist   [facility]         [1]   Allergies  Allergen Reactions    Penicillins    [2]   Past Medical History:  Diagnosis Date    Achrochordon     Resolved 3/25/2016     Anxiety 08/08/2022    Arthritis     Genital warts     Resolved 11/22/2014     Neoplasm of uncertain behavior of skin     Resolved 3/25/2016     Numbness of left anterior thigh     Resolved 6/29/2016    [3] No past surgical history on file.  [4]   Family History  Problem Relation Name Age of Onset    No Known Problems Mother      No Known Problems Father      No Known Problems Brother     [5]    Social History  Tobacco Use   Smoking Status Former    Current packs/day: 0.00    Average packs/day: 0.3 packs/day for 4.0 years (1.0 ttl pk-yrs)    Types: Cigarettes    Start date:     Quit date:     Years since quittin.5   Smokeless Tobacco Never

## 2025-07-14 ENCOUNTER — TELEPHONE (OUTPATIENT)
Age: 50
End: 2025-07-14

## 2025-07-14 NOTE — TELEPHONE ENCOUNTER
Spoke to patient about available appointments.  Patient will speak to his  and call back to schedule.  Gave patient my direct number to call to schedule.

## 2025-07-14 NOTE — TELEPHONE ENCOUNTER
Caller: ruel Dejesus    Doctor: Dr. Cartwright    Reason for call: pt followed up with ortho and the said the TFESI was absolutely the correct injection for the pt.  The pt would like to proceed with getting that injection    Please advise     Call back#: [phone]

## 2025-07-16 NOTE — TELEPHONE ENCOUNTER
Procedure scheduled 7/22/25    Reviewed instructions: , NPO 1 hour prior, loose-fitting/comfortable pants, if ill/fever/infx/abx to call and reschedule.  No immunizations or vaccinations 2 days prior/after steroid injections. Patient stated verbal understanding.

## 2025-07-22 ENCOUNTER — HOSPITAL ENCOUNTER (OUTPATIENT)
Dept: RADIOLOGY | Facility: MEDICAL CENTER | Age: 50
Discharge: HOME | End: 2025-07-22
Attending: PHYSICAL MEDICINE & REHABILITATION
Payer: COMMERCIAL

## 2025-07-22 VITALS
OXYGEN SATURATION: 97 % | HEART RATE: 82 BPM | DIASTOLIC BLOOD PRESSURE: 94 MMHG | RESPIRATION RATE: 18 BRPM | TEMPERATURE: 97.6 F | SYSTOLIC BLOOD PRESSURE: 135 MMHG

## 2025-07-22 DIAGNOSIS — M54.16 LUMBAR RADICULOPATHY: ICD-10-CM

## 2025-07-22 PROCEDURE — 64483 NJX AA&/STRD TFRM EPI L/S 1: CPT | Performed by: PHYSICAL MEDICINE & REHABILITATION

## 2025-07-22 RX ORDER — PAPAVERINE HCL 150 MG
10 CAPSULE, EXTENDED RELEASE ORAL ONCE
Status: COMPLETED | OUTPATIENT
Start: 2025-07-22 | End: 2025-07-22

## 2025-07-22 RX ADMIN — IOHEXOL 2 ML: 300 INJECTION, SOLUTION INTRAVENOUS at 14:46

## 2025-07-22 RX ADMIN — DEXAMETHASONE SODIUM PHOSPHATE 10 MG: 10 INJECTION INTRAMUSCULAR; INTRAVENOUS at 14:46

## 2025-07-22 NOTE — H&P
History of Present Illness: The patient is a 49 y.o. male who presents with complaints of right back and leg pain    Past Medical History[1]    Past Surgical History[2]    Current Medications[3]    Allergies[4]    Physical Exam:   Vitals:    07/22/25 1437   BP: 135/91   Pulse: 67   Resp: 18   Temp: 97.6 °F (36.4 °C)   SpO2: 98%     General: Awake, Alert, Oriented x 3, Mood and affect appropriate  Respiratory: Respirations even and unlabored  Cardiovascular: Peripheral pulses intact; no edema  Musculoskeletal Exam: right back and leg pain    ASA Score: 2    Patient/Chart Verification  Patient ID Verified: Verbal  ID Band Applied: No  Consents Confirmed: To be obtained in the Procedural area  Interval H&P(within 24 hr) Complete (required for Outpatients and Surgery Admit only): To be obtained in the Procedural area  Allergies Reviewed: Yes  Anticoag/NSAID held?: NA  Currently on antibiotics?: No    Assessment:   1. Lumbar radiculopathy        Plan: Rt S1 TFESI (70226)         [1]   Past Medical History:  Diagnosis Date    Achrochordon     Resolved 3/25/2016     Anxiety 08/08/2022    Arthritis     Genital warts     Resolved 11/22/2014     Neoplasm of uncertain behavior of skin     Resolved 3/25/2016     Numbness of left anterior thigh     Resolved 6/29/2016    [2] No past surgical history on file.  [3]   Current Outpatient Medications:     ALPRAZolam (XANAX) 0.5 mg tablet, Take 1 tablet (0.5 mg total) by mouth once as needed for anxiety (before procedure) for up to 1 dose (Patient not taking: Reported on 3/17/2023), Disp: 1 tablet, Rfl: 0    gabapentin (Neurontin) 300 mg capsule, Take 1 capsule (300 mg total) by mouth daily at bedtime (Patient not taking: Reported on 11/5/2024), Disp: 30 capsule, Rfl: 0    ibuprofen (MOTRIN) 100 mg/5 mL suspension, Take by mouth every 6 (six) hours as needed for mild pain (Patient not taking: Reported on 11/13/2023), Disp: , Rfl:     ibuprofen (MOTRIN) 200 mg tablet, Take by mouth  every 6 (six) hours as needed for mild pain (Patient not taking: Reported on 2/8/2024), Disp: , Rfl:     ketoconazole (NIZORAL) 2 % shampoo, Apply 1 Application topically 2 (two) times a week (Patient not taking: Reported on 9/20/2024), Disp: 120 mL, Rfl: 3    meloxicam (MOBIC) 7.5 mg tablet, TAKE 1 TABLET (7.5 MG TOTAL) BY MOUTH DAILY AS NEEDED FOR MODERATE PAIN (Patient not taking: Reported on 4/2/2025), Disp: 30 tablet, Rfl: 0    methylPREDNISolone 4 MG tablet therapy pack, Use as directed on package (Patient not taking: Reported on 9/20/2024), Disp: 21 tablet, Rfl: 0    methylPREDNISolone 4 MG tablet therapy pack, Use as directed on package (Patient not taking: Reported on 1/13/2025), Disp: 21 tablet, Rfl: 0    methylPREDNISolone 4 MG tablet therapy pack, Use as directed on package, Disp: 21 each, Rfl: 0    nabumetone (RELAFEN) 750 mg tablet, Take 1 tablet (750 mg total) by mouth 2 (two) times a day (Patient not taking: Reported on 1/13/2025), Disp: 60 tablet, Rfl: 0    naproxen (Naprosyn) 500 mg tablet, Take 1 tablet (500 mg total) by mouth 2 (two) times a day with meals (Patient not taking: Reported on 8/13/2024), Disp: 60 tablet, Rfl: 0    NON FORMULARY, Medical Marijuana- Vapes (Patient not taking: Reported on 8/30/2023), Disp: , Rfl:     Current Facility-Administered Medications:     lidocaine (XYLOCAINE) 1 % injection 1 mL, 1 mL, Injection, , , 1 mL at 10/23/24 0800    triamcinolone acetonide (KENALOG-40) 40 mg/mL injection 20 mg, 20 mg, Infiltration, , , 20 mg at 10/23/24 0800  [4]   Allergies  Allergen Reactions    Penicillins

## 2025-07-22 NOTE — DISCHARGE INSTR - LAB
Epidural Steroid Injection   WHAT YOU NEED TO KNOW:   An epidural steroid injection (DENISE) is a procedure to inject steroid medicine into the epidural space. The epidural space is between your spinal cord and vertebrae. Steroids reduce inflammation and fluid buildup in your spine that may be causing pain. You may be given pain medicine along with the steroids.          ACTIVITY  Do not drive or operate machinery today.  No strenuous activity today - bending, lifting, etc.  You may resume normal activites starting tomorrow - start slowly and as tolerated.  You may shower today, but no tub baths or hot tubs.  You may have numbness for several hours from the local anesthetic. Please use caution and common sense, especially with weight-bearing activities.    CARE OF THE INJECTION SITE  If you have soreness or pain, apply ice to the area today (20 minutes on/20 minutes off).  Starting tomorrow, you may use warm, moist heat or ice if needed.  You may have an increase or change in your discomfort for 36-48 hours after your treatment.  Apply ice and continue with any pain medication you have been prescribed.  Notify the [facility] if you have any of the following: redness, drainage, swelling, headache, stiff neck or fever above 100°F.    SPECIAL INSTRUCTIONS  Our office will contact you in approximately 14 days for a progress report.    MEDICATIONS  Continue to take all routine medications.  Our office may have instructed you to hold some medications.    As no general anesthesia was used in today's procedure, you should not experience any side effects related to anesthesia.     If you are diabetic, the steroids used in today's injection may temporarily increase your blood sugar levels after the first few days after your injection. Please keep a close eye on your sugars and alert the doctor who manages your diabetes if your sugars are significantly high from your baseline or you are symptomatic.     If you have a  problem specifically related to your procedure, please call our office at [phone].  Problems not related to your procedure should be directed to your primary care physician.

## 2025-07-29 ENCOUNTER — TELEPHONE (OUTPATIENT)
Age: 50
End: 2025-07-29

## 2025-08-05 ENCOUNTER — OFFICE VISIT (OUTPATIENT)
Dept: PAIN MEDICINE | Facility: MEDICAL CENTER | Age: 50
End: 2025-08-05
Payer: COMMERCIAL

## 2025-08-05 ENCOUNTER — APPOINTMENT (OUTPATIENT)
Dept: RADIOLOGY | Facility: MEDICAL CENTER | Age: 50
End: 2025-08-05
Payer: COMMERCIAL

## 2025-08-05 ENCOUNTER — TELEPHONE (OUTPATIENT)
Dept: PAIN MEDICINE | Facility: CLINIC | Age: 50
End: 2025-08-05

## 2025-08-05 VITALS — WEIGHT: 208 LBS | HEIGHT: 69 IN | BODY MASS INDEX: 30.81 KG/M2

## 2025-08-05 DIAGNOSIS — M54.16 RIGHT LUMBAR RADICULITIS: ICD-10-CM

## 2025-08-05 DIAGNOSIS — M54.16 RIGHT LUMBAR RADICULITIS: Primary | ICD-10-CM

## 2025-08-05 DIAGNOSIS — M25.551 RIGHT HIP PAIN: ICD-10-CM

## 2025-08-05 DIAGNOSIS — G57.01 PIRIFORMIS SYNDROME OF RIGHT SIDE: ICD-10-CM

## 2025-08-05 PROCEDURE — 73502 X-RAY EXAM HIP UNI 2-3 VIEWS: CPT

## 2025-08-05 PROCEDURE — 99214 OFFICE O/P EST MOD 30 MIN: CPT

## 2025-08-05 PROCEDURE — 72114 X-RAY EXAM L-S SPINE BENDING: CPT

## 2025-08-05 RX ORDER — PREGABALIN 75 MG/1
75 CAPSULE ORAL 2 TIMES DAILY
Qty: 60 CAPSULE | Refills: 0 | Status: CANCELLED | OUTPATIENT
Start: 2025-08-05

## 2025-08-05 RX ORDER — PREGABALIN 75 MG/1
75 CAPSULE ORAL 2 TIMES DAILY
Qty: 60 CAPSULE | Refills: 1 | Status: SHIPPED | OUTPATIENT
Start: 2025-08-05

## 2025-08-07 ENCOUNTER — TELEPHONE (OUTPATIENT)
Dept: PAIN MEDICINE | Facility: MEDICAL CENTER | Age: 50
End: 2025-08-07

## 2025-08-13 ENCOUNTER — CONSULT (OUTPATIENT)
Age: 50
End: 2025-08-13
Payer: COMMERCIAL